# Patient Record
Sex: FEMALE | Race: WHITE | NOT HISPANIC OR LATINO | Employment: OTHER | URBAN - METROPOLITAN AREA
[De-identification: names, ages, dates, MRNs, and addresses within clinical notes are randomized per-mention and may not be internally consistent; named-entity substitution may affect disease eponyms.]

---

## 2017-12-29 ENCOUNTER — TRANSCRIBE ORDERS (OUTPATIENT)
Dept: ADMINISTRATIVE | Facility: HOSPITAL | Age: 67
End: 2017-12-29

## 2017-12-29 DIAGNOSIS — R92.8 ABNORMAL MAMMOGRAM: Primary | ICD-10-CM

## 2018-02-07 ENCOUNTER — APPOINTMENT (OUTPATIENT)
Dept: MAMMOGRAPHY | Facility: HOSPITAL | Age: 68
End: 2018-02-07
Attending: FAMILY MEDICINE

## 2018-02-08 ENCOUNTER — APPOINTMENT (OUTPATIENT)
Dept: MAMMOGRAPHY | Facility: HOSPITAL | Age: 68
End: 2018-02-08
Attending: FAMILY MEDICINE

## 2018-04-19 ENCOUNTER — HOSPITAL ENCOUNTER (OUTPATIENT)
Dept: ULTRASOUND IMAGING | Facility: HOSPITAL | Age: 68
Discharge: HOME | End: 2018-04-19

## 2018-04-19 ENCOUNTER — HOSPITAL ENCOUNTER (OUTPATIENT)
Dept: MAMMOGRAPHY | Facility: HOSPITAL | Age: 68
Discharge: HOME | End: 2018-04-19
Attending: FAMILY MEDICINE | Admitting: FAMILY MEDICINE

## 2018-04-19 DIAGNOSIS — R92.8 ABNORMAL MAMMOGRAM: ICD-10-CM

## 2018-04-19 PROCEDURE — 77066 DX MAMMO INCL CAD BI: CPT | Performed by: RADIOLOGY

## 2018-04-19 PROCEDURE — 77066 DX MAMMO INCL CAD BI: CPT

## 2018-04-19 PROCEDURE — G0279 TOMOSYNTHESIS, MAMMO: HCPCS

## 2018-04-19 PROCEDURE — 76642 ULTRASOUND BREAST LIMITED: CPT | Performed by: RADIOLOGY

## 2018-04-19 PROCEDURE — G0279 TOMOSYNTHESIS, MAMMO: HCPCS | Performed by: RADIOLOGY

## 2018-04-19 PROCEDURE — 76642 ULTRASOUND BREAST LIMITED: CPT

## 2019-06-19 ENCOUNTER — TRANSCRIBE ORDERS (OUTPATIENT)
Dept: ADMINISTRATIVE | Facility: HOSPITAL | Age: 69
End: 2019-06-19

## 2019-06-19 DIAGNOSIS — R92.8 ABNORMAL MAMMOGRAM: Primary | ICD-10-CM

## 2019-09-03 ENCOUNTER — HOSPITAL ENCOUNTER (OUTPATIENT)
Dept: ULTRASOUND IMAGING | Facility: HOSPITAL | Age: 69
Discharge: HOME | End: 2019-09-03

## 2019-09-03 ENCOUNTER — HOSPITAL ENCOUNTER (OUTPATIENT)
Dept: MAMMOGRAPHY | Facility: HOSPITAL | Age: 69
Discharge: HOME | End: 2019-09-03
Admitting: FAMILY MEDICINE

## 2019-09-03 DIAGNOSIS — R92.8 ABNORMAL MAMMOGRAM: ICD-10-CM

## 2019-09-03 PROCEDURE — 77066 DX MAMMO INCL CAD BI: CPT | Performed by: RADIOLOGY

## 2019-09-03 PROCEDURE — 76642 ULTRASOUND BREAST LIMITED: CPT | Performed by: RADIOLOGY

## 2019-09-03 PROCEDURE — G0279 TOMOSYNTHESIS, MAMMO: HCPCS | Performed by: RADIOLOGY

## 2019-09-03 PROCEDURE — 76642 ULTRASOUND BREAST LIMITED: CPT

## 2019-09-03 PROCEDURE — 77066 DX MAMMO INCL CAD BI: CPT

## 2019-09-03 PROCEDURE — G0279 TOMOSYNTHESIS, MAMMO: HCPCS

## 2020-08-17 ENCOUNTER — TRANSCRIBE ORDERS (OUTPATIENT)
Dept: ADMINISTRATIVE | Facility: HOSPITAL | Age: 70
End: 2020-08-17

## 2020-08-17 ENCOUNTER — HOSPITAL ENCOUNTER (OUTPATIENT)
Dept: GENERAL RADIOLOGY | Facility: HOSPITAL | Age: 70
Discharge: HOME | End: 2020-08-17
Admitting: FAMILY MEDICINE

## 2020-08-17 DIAGNOSIS — R07.9 CHEST PAIN, UNSPECIFIED TYPE: ICD-10-CM

## 2020-08-17 DIAGNOSIS — R07.9 CHEST PAIN, UNSPECIFIED TYPE: Primary | ICD-10-CM

## 2020-08-17 PROCEDURE — 71046 X-RAY EXAM CHEST 2 VIEWS: CPT

## 2020-10-05 ENCOUNTER — TRANSCRIBE ORDERS (OUTPATIENT)
Dept: ADMINISTRATIVE | Facility: HOSPITAL | Age: 70
End: 2020-10-05

## 2020-10-05 DIAGNOSIS — Z12.31 VISIT FOR SCREENING MAMMOGRAM: Primary | ICD-10-CM

## 2020-10-06 ENCOUNTER — HOSPITAL ENCOUNTER (OUTPATIENT)
Dept: MAMMOGRAPHY | Facility: HOSPITAL | Age: 70
Discharge: HOME | End: 2020-10-06
Admitting: FAMILY MEDICINE

## 2020-10-06 DIAGNOSIS — Z12.31 VISIT FOR SCREENING MAMMOGRAM: ICD-10-CM

## 2020-10-06 PROCEDURE — 77063 BREAST TOMOSYNTHESIS BI: CPT | Performed by: RADIOLOGY

## 2020-10-06 PROCEDURE — 77063 BREAST TOMOSYNTHESIS BI: CPT

## 2020-10-06 PROCEDURE — 77067 SCR MAMMO BI INCL CAD: CPT

## 2020-10-06 PROCEDURE — 77067 SCR MAMMO BI INCL CAD: CPT | Performed by: RADIOLOGY

## 2020-10-28 ENCOUNTER — TRANSCRIBE ORDERS (OUTPATIENT)
Dept: ADMINISTRATIVE | Facility: HOSPITAL | Age: 70
End: 2020-10-28

## 2020-10-28 ENCOUNTER — HOSPITAL ENCOUNTER (OUTPATIENT)
Dept: GENERAL RADIOLOGY | Facility: HOSPITAL | Age: 70
Discharge: HOME | End: 2020-10-28
Admitting: FAMILY MEDICINE

## 2020-10-28 DIAGNOSIS — R06.02 SHORTNESS OF BREATH: Primary | ICD-10-CM

## 2020-10-28 DIAGNOSIS — R06.02 SHORTNESS OF BREATH: ICD-10-CM

## 2020-10-28 PROCEDURE — 71046 X-RAY EXAM CHEST 2 VIEWS: CPT

## 2020-11-03 ENCOUNTER — TRANSCRIBE ORDERS (OUTPATIENT)
Dept: ADMINISTRATIVE | Facility: HOSPITAL | Age: 70
End: 2020-11-03

## 2020-11-03 DIAGNOSIS — R01.1 SYSTOLIC MURMUR: Primary | ICD-10-CM

## 2020-12-10 ENCOUNTER — HOSPITAL ENCOUNTER (OUTPATIENT)
Dept: CARDIOLOGY | Facility: HOSPITAL | Age: 70
Discharge: HOME | End: 2020-12-10
Admitting: FAMILY MEDICINE

## 2020-12-10 VITALS — HEIGHT: 68 IN | WEIGHT: 165 LBS | BODY MASS INDEX: 25.01 KG/M2

## 2020-12-10 DIAGNOSIS — R01.1 SYSTOLIC MURMUR: ICD-10-CM

## 2020-12-10 LAB
BH CV ECHO MEAS - AO MAX PG (FULL): 1.9 MMHG
BH CV ECHO MEAS - AO MAX PG: 4 MMHG
BH CV ECHO MEAS - AO MEAN PG (FULL): 2 MMHG
BH CV ECHO MEAS - AO MEAN PG: 3 MMHG
BH CV ECHO MEAS - AO ROOT AREA (BSA CORRECTED): 1.6
BH CV ECHO MEAS - AO ROOT AREA: 7.5 CM^2
BH CV ECHO MEAS - AO ROOT DIAM: 3.1 CM
BH CV ECHO MEAS - AO V2 MAX: 103 CM/SEC
BH CV ECHO MEAS - AO V2 MEAN: 76.9 CM/SEC
BH CV ECHO MEAS - AO V2 VTI: 26.2 CM
BH CV ECHO MEAS - ASC AORTA: 3.4 CM
BH CV ECHO MEAS - AVA(I,A): 2.4 CM^2
BH CV ECHO MEAS - AVA(I,D): 2.4 CM^2
BH CV ECHO MEAS - AVA(V,A): 2.2 CM^2
BH CV ECHO MEAS - AVA(V,D): 2.2 CM^2
BH CV ECHO MEAS - BSA(HAYCOCK): 1.9 M^2
BH CV ECHO MEAS - BSA: 1.9 M^2
BH CV ECHO MEAS - BZI_BMI: 25.1 KILOGRAMS/M^2
BH CV ECHO MEAS - BZI_METRIC_HEIGHT: 172.7 CM
BH CV ECHO MEAS - BZI_METRIC_WEIGHT: 74.8 KG
BH CV ECHO MEAS - EDV(CUBED): 26.2 ML
BH CV ECHO MEAS - EDV(MOD-SP2): 91 ML
BH CV ECHO MEAS - EDV(MOD-SP4): 101 ML
BH CV ECHO MEAS - EDV(TEICH): 34.2 ML
BH CV ECHO MEAS - EF(CUBED): 65.2 %
BH CV ECHO MEAS - EF(MOD-BP): 59 %
BH CV ECHO MEAS - EF(MOD-SP2): 58.2 %
BH CV ECHO MEAS - EF(MOD-SP4): 60.4 %
BH CV ECHO MEAS - EF(TEICH): 58.3 %
BH CV ECHO MEAS - ESV(CUBED): 9.1 ML
BH CV ECHO MEAS - ESV(MOD-SP2): 38 ML
BH CV ECHO MEAS - ESV(MOD-SP4): 40 ML
BH CV ECHO MEAS - ESV(TEICH): 14.2 ML
BH CV ECHO MEAS - FS: 29.6 %
BH CV ECHO MEAS - IVS/LVPW: 1.1
BH CV ECHO MEAS - IVSD: 1 CM
BH CV ECHO MEAS - LA DIMENSION: 3 CM
BH CV ECHO MEAS - LA/AO: 0.97
BH CV ECHO MEAS - LAD MAJOR: 5.1 CM
BH CV ECHO MEAS - LAT PEAK E' VEL: 9.5 CM/SEC
BH CV ECHO MEAS - LATERAL E/E' RATIO: 7.2
BH CV ECHO MEAS - LV DIASTOLIC VOL/BSA (35-75): 53.6 ML/M^2
BH CV ECHO MEAS - LV IVRT: 0.1 SEC
BH CV ECHO MEAS - LV MASS(C)D: 76.9 GRAMS
BH CV ECHO MEAS - LV MASS(C)DI: 40.8 GRAMS/M^2
BH CV ECHO MEAS - LV MAX PG: 2.1 MMHG
BH CV ECHO MEAS - LV MEAN PG: 1 MMHG
BH CV ECHO MEAS - LV SYSTOLIC VOL/BSA (12-30): 21.2 ML/M^2
BH CV ECHO MEAS - LV V1 MAX: 72.9 CM/SEC
BH CV ECHO MEAS - LV V1 MEAN: 48.2 CM/SEC
BH CV ECHO MEAS - LV V1 VTI: 20 CM
BH CV ECHO MEAS - LVIDD: 3 CM
BH CV ECHO MEAS - LVIDS: 2.1 CM
BH CV ECHO MEAS - LVLD AP2: 8.1 CM
BH CV ECHO MEAS - LVLD AP4: 7.8 CM
BH CV ECHO MEAS - LVLS AP2: 6.7 CM
BH CV ECHO MEAS - LVLS AP4: 6.5 CM
BH CV ECHO MEAS - LVOT AREA (M): 3.1 CM^2
BH CV ECHO MEAS - LVOT AREA: 3.1 CM^2
BH CV ECHO MEAS - LVOT DIAM: 2 CM
BH CV ECHO MEAS - LVPWD: 0.92 CM
BH CV ECHO MEAS - MED PEAK E' VEL: 5.4 CM/SEC
BH CV ECHO MEAS - MEDIAL E/E' RATIO: 12.9
BH CV ECHO MEAS - MV A MAX VEL: 65.2 CM/SEC
BH CV ECHO MEAS - MV DEC SLOPE: 227 CM/SEC^2
BH CV ECHO MEAS - MV DEC TIME: 0.25 SEC
BH CV ECHO MEAS - MV E MAX VEL: 69.1 CM/SEC
BH CV ECHO MEAS - MV E/A: 1.1
BH CV ECHO MEAS - MV P1/2T MAX VEL: 69 CM/SEC
BH CV ECHO MEAS - MV P1/2T: 89 MSEC
BH CV ECHO MEAS - MVA P1/2T LCG: 3.2 CM^2
BH CV ECHO MEAS - MVA(P1/2T): 2.5 CM^2
BH CV ECHO MEAS - PA ACC SLOPE: 662 CM/SEC^2
BH CV ECHO MEAS - PA ACC TIME: 0.11 SEC
BH CV ECHO MEAS - PA PR(ACCEL): 29.1 MMHG
BH CV ECHO MEAS - PA V2 MAX: 86 CM/SEC
BH CV ECHO MEAS - PULM A REVS VEL: 33.6 CM/SEC
BH CV ECHO MEAS - PULM DIAS VEL: 37.5 CM/SEC
BH CV ECHO MEAS - PULM S/D: 1.7
BH CV ECHO MEAS - PULM SYS VEL: 62.2 CM/SEC
BH CV ECHO MEAS - RAP SYSTOLE: 8 MMHG
BH CV ECHO MEAS - RVSP: 33 MMHG
BH CV ECHO MEAS - SI(AO): 105 ML/M^2
BH CV ECHO MEAS - SI(CUBED): 9.1 ML/M^2
BH CV ECHO MEAS - SI(LVOT): 33.4 ML/M^2
BH CV ECHO MEAS - SI(MOD-SP2): 28.1 ML/M^2
BH CV ECHO MEAS - SI(MOD-SP4): 32.4 ML/M^2
BH CV ECHO MEAS - SI(TEICH): 10.6 ML/M^2
BH CV ECHO MEAS - SV(AO): 197.7 ML
BH CV ECHO MEAS - SV(CUBED): 17.1 ML
BH CV ECHO MEAS - SV(LVOT): 62.8 ML
BH CV ECHO MEAS - SV(MOD-SP2): 53 ML
BH CV ECHO MEAS - SV(MOD-SP4): 61 ML
BH CV ECHO MEAS - SV(TEICH): 19.9 ML
BH CV ECHO MEAS - TAPSE (>1.6): 2.3 CM
BH CV ECHO MEAS - TR MAX PG: 25 MMHG
BH CV ECHO MEAS - TR MAX VEL: 248 CM/SEC
BH CV ECHO MEASUREMENTS AVERAGE E/E' RATIO: 9.28
BH CV VAS BP LEFT ARM: NORMAL MMHG
BH CV XLRA - RV BASE: 3.9 CM
BH CV XLRA - RV LENGTH: 6.8 CM
BH CV XLRA - RV MID: 3.5 CM
BH CV XLRA - TDI S': 12.2 CM/SEC
LEFT ATRIUM VOLUME INDEX: 29.7 ML/M^2
LEFT ATRIUM VOLUME: 56 ML
LV EF 2D ECHO EST: 70 %

## 2020-12-10 PROCEDURE — 93306 TTE W/DOPPLER COMPLETE: CPT

## 2020-12-10 PROCEDURE — 93306 TTE W/DOPPLER COMPLETE: CPT | Performed by: INTERNAL MEDICINE

## 2021-06-23 ENCOUNTER — TRANSCRIBE ORDERS (OUTPATIENT)
Dept: ADMINISTRATIVE | Facility: HOSPITAL | Age: 71
End: 2021-06-23

## 2021-06-23 DIAGNOSIS — R05.3 PERSISTENT COUGH: Primary | ICD-10-CM

## 2021-07-19 ENCOUNTER — HOSPITAL ENCOUNTER (OUTPATIENT)
Dept: CT IMAGING | Facility: HOSPITAL | Age: 71
Discharge: HOME | End: 2021-07-19
Admitting: FAMILY MEDICINE

## 2021-07-19 DIAGNOSIS — R05.3 PERSISTENT COUGH: ICD-10-CM

## 2021-07-19 PROCEDURE — 71250 CT THORAX DX C-: CPT

## 2022-03-15 ENCOUNTER — TRANSCRIBE ORDERS (OUTPATIENT)
Dept: ADMINISTRATIVE | Facility: HOSPITAL | Age: 72
End: 2022-03-15

## 2022-03-15 ENCOUNTER — HOSPITAL ENCOUNTER (OUTPATIENT)
Dept: GENERAL RADIOLOGY | Facility: HOSPITAL | Age: 72
Discharge: HOME | End: 2022-03-15
Admitting: FAMILY MEDICINE

## 2022-03-15 DIAGNOSIS — M75.42 SHOULDER IMPINGEMENT, LEFT: Primary | ICD-10-CM

## 2022-03-15 DIAGNOSIS — M75.42 SHOULDER IMPINGEMENT, LEFT: ICD-10-CM

## 2022-03-15 PROCEDURE — 73030 X-RAY EXAM OF SHOULDER: CPT

## 2022-08-16 ENCOUNTER — TRANSCRIBE ORDERS (OUTPATIENT)
Dept: ADMINISTRATIVE | Facility: HOSPITAL | Age: 72
End: 2022-08-16

## 2022-08-16 ENCOUNTER — HOSPITAL ENCOUNTER (OUTPATIENT)
Dept: GENERAL RADIOLOGY | Facility: HOSPITAL | Age: 72
Discharge: HOME | End: 2022-08-16
Admitting: FAMILY MEDICINE

## 2022-08-16 DIAGNOSIS — J84.10 PULMONARY GRANULOMA: Primary | ICD-10-CM

## 2022-08-16 DIAGNOSIS — J84.10 PULMONARY GRANULOMA: ICD-10-CM

## 2022-08-16 PROCEDURE — 71046 X-RAY EXAM CHEST 2 VIEWS: CPT

## 2022-09-26 ENCOUNTER — OFFICE VISIT (OUTPATIENT)
Dept: PULMONOLOGY | Facility: CLINIC | Age: 72
End: 2022-09-26

## 2022-09-26 VITALS
RESPIRATION RATE: 16 BRPM | HEIGHT: 68 IN | WEIGHT: 173.13 LBS | HEART RATE: 53 BPM | BODY MASS INDEX: 26.24 KG/M2 | SYSTOLIC BLOOD PRESSURE: 144 MMHG | OXYGEN SATURATION: 97 % | TEMPERATURE: 97.7 F | DIASTOLIC BLOOD PRESSURE: 96 MMHG

## 2022-09-26 DIAGNOSIS — R06.02 SOB (SHORTNESS OF BREATH): Primary | ICD-10-CM

## 2022-09-26 DIAGNOSIS — U09.9 POST-COVID CHRONIC COUGH: ICD-10-CM

## 2022-09-26 DIAGNOSIS — R05.3 POST-COVID CHRONIC COUGH: ICD-10-CM

## 2022-09-26 PROCEDURE — 94375 RESPIRATORY FLOW VOLUME LOOP: CPT | Performed by: INTERNAL MEDICINE

## 2022-09-26 PROCEDURE — 94729 DIFFUSING CAPACITY: CPT | Performed by: INTERNAL MEDICINE

## 2022-09-26 PROCEDURE — 94726 PLETHYSMOGRAPHY LUNG VOLUMES: CPT | Performed by: INTERNAL MEDICINE

## 2022-09-26 PROCEDURE — 99203 OFFICE O/P NEW LOW 30 MIN: CPT | Performed by: INTERNAL MEDICINE

## 2022-09-26 RX ORDER — BUDESONIDE, GLYCOPYRROLATE, AND FORMOTEROL FUMARATE 160; 9; 4.8 UG/1; UG/1; UG/1
2 AEROSOL, METERED RESPIRATORY (INHALATION) 2 TIMES DAILY
COMMUNITY
Start: 2022-08-26 | End: 2022-09-26

## 2022-09-26 RX ORDER — ALBUTEROL SULFATE 90 UG/1
2 AEROSOL, METERED RESPIRATORY (INHALATION) EVERY 4 HOURS PRN
Qty: 18 G | Refills: 11 | Status: SHIPPED | OUTPATIENT
Start: 2022-09-26

## 2022-09-26 RX ORDER — LEVOTHYROXINE SODIUM 0.15 MG/1
150 TABLET ORAL DAILY
COMMUNITY
Start: 2022-03-15

## 2022-09-26 RX ORDER — SERTRALINE HYDROCHLORIDE 25 MG/1
25 TABLET, FILM COATED ORAL DAILY
COMMUNITY
Start: 2022-07-05

## 2022-09-26 RX ORDER — TRIAMTERENE AND HYDROCHLOROTHIAZIDE 37.5; 25 MG/1; MG/1
1 CAPSULE ORAL EVERY MORNING
COMMUNITY

## 2022-09-26 NOTE — PROGRESS NOTES
Reba Kong is a 71 y.o. female here for evaluation of   Chief Complaint   Patient presents with   • Persistent Cough       Problem list:  1. 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 Dyspnea on exertion  3. GERD/hiatal hernia  4. Hyperlipidemia  5. Hypothyroidism  6.  1 para 1  7. EGD , peptic ulcer disease, GERD, hiatal hernia  8. Hysterectomy   9. Colonoscopy with polypectomy, unknown  10. Oophorectomy  11. Penicillin causes a rash  12. No tobacco    History of Present Illness    71-year-old woman, non-smoker, antibiotics or who has a history of 2 COVID-19 infections, the first in 2020 and again in 2021.  She did get monoclonal antibodies for one of the infections.  She was clinically ill for about 3 weeks with her first infection.  However her second 1 was a mild illness.  Since her COVID infection she has been short of air  and has had a persistent cough.  She feels symptomatically short of air anytime she bends over.  However she reports that she can walk fine do her housework without difficulty.  The cough also occurs when she bends over or if she is anxious.  She also coughs at night when she lays down.  She does not hear wheezing.  She has no history of asthma.  She was around secondhand smoke as a child.  She denies noxious fume exposure or mold.  She was given breztri as a trial.  She is not using it regularly.  When she does use it though she gets some symptomatic relief.  She is only used it 3 or 4 times.  She was not aware that she had GERD on a previous EGD.  She denies symptoms of heartburn or waterbrash symptoms.      Review of Systems   Respiratory: Positive for cough and shortness of breath.    All other systems reviewed and are negative.        Current Outpatient Medications:   •  Budeson-Glycopyrrol-Formoterol (Breztri Aerosphere) 160-9-4.8 MCG/ACT aerosol inhaler, Inhale 2 puffs 2 (Two) Times a Day., Disp: , Rfl:   •  levothyroxine (SYNTHROID, LEVOTHROID) 150 MCG  "tablet, Take 150 mcg by mouth Daily., Disp: , Rfl:   •  sertraline (ZOLOFT) 25 MG tablet, Take 25 mg by mouth Daily., Disp: , Rfl:   •  triamterene-hydrochlorothiazide (DYAZIDE) 37.5-25 MG per capsule, Take 1 capsule by mouth Every Morning., Disp: , Rfl:     Past Medical History:   Diagnosis Date   • Hiatal hernia with GERD    • Hyperlipidemia    • Hypothyroidism    • PUD (peptic ulcer disease) 2015     Past Surgical History:   Procedure Laterality Date   • COLONOSCOPY W/ POLYPECTOMY     • ENDOSCOPY  2015   • HYSTERECTOMY  2003   • OOPHORECTOMY       Social History     Socioeconomic History   • Marital status:    • Number of children: 1   Tobacco Use   • Smoking status: Never Smoker   • Smokeless tobacco: Never Used   Substance and Sexual Activity   • Alcohol use: Yes     Alcohol/week: 4.0 - 7.0 standard drinks     Types: 4 - 7 Glasses of wine per week   • Drug use: Never   • Sexual activity: Defer     Family History   Problem Relation Age of Onset   • Lung cancer Mother    • Leukemia Father    • Heart failure Brother    • Breast cancer Neg Hx    • Ovarian cancer Neg Hx    • Endometrial cancer Neg Hx      Blood pressure 144/96, pulse 53, temperature 97.7 °F (36.5 °C), resp. rate 16, height 172.7 cm (68\"), weight 78.5 kg (173 lb 2 oz), SpO2 97 %.    Physical Exam  Constitutional:       General: She is not in acute distress.  HENT:      Head: Normocephalic and atraumatic.      Nose: No congestion.      Mouth/Throat:      Mouth: Mucous membranes are moist.      Pharynx: Oropharynx is clear.   Eyes:      Conjunctiva/sclera: Conjunctivae normal.      Pupils: Pupils are equal, round, and reactive to light.   Cardiovascular:      Rate and Rhythm: Normal rate and regular rhythm.      Heart sounds: No murmur heard.  Pulmonary:      Breath sounds: No wheezing.      Comments: Each time took deep breath she coughs on expiration  Abdominal:      General: Bowel sounds are normal.      Palpations: Abdomen is soft. "   Musculoskeletal:      Right lower leg: No edema.      Left lower leg: No edema.   Lymphadenopathy:      Cervical: No cervical adenopathy.   Skin:     General: Skin is warm and dry.   Neurological:      Mental Status: She is alert and oriented to person, place, and time.   Psychiatric:         Mood and Affect: Mood normal.           PFTs:    September 26, 2022, PFTs, FVC 1.88 L, 53%, FEV1 1.39 L, 51% ratio 74%, mid flows 45%, total lung capacity 3.69 L, 66%, diffusion capacity 13.3, 60%, patient coughed throughout the test which may have impacted her numbers, restriction and obstruction, diffusion impairment    Radiology:    PA and lateral chest x-ray August 10, 2022, scattered calcified granulomatous findings, no infiltrate or effusion, heart not enlarged    COMPARISON: NONE     FINDINGS: No pathologic axillary adenopathy or other worrisome body wall  soft tissue finding in the chest. No acute findings in the partially  imaged upper abdomen. No pleural or pericardial effusion. No pathologic  mediastinal or hilar adenopathy. Minimally atherosclerotic nonaneurysmal  thoracic aorta. Evaluation of the osseous structures demonstrates no  evidence of acute fracture or aggressive osseous lesion. Evaluation of  the lung parenchyma demonstrates no evidence of acute infectious or  inflammatory process. There are no suspicious pulmonary nodules.     IMPRESSION:  Essentially normal noncontrast CT of the chest. There are no  suspicious pulmonary nodules and there is no evidence of acute  infectious or inflammatory process.        This report was finalized on 7/20/2021 8:59 AM by Bruno Walter.       Lab:    Diagnoses and all orders for this visit:    1. SOB (shortness of breath) (Primary)  -     Pulmonary Function Test    2. Post-COVID chronic cough        Discussion:     Interesting 71-year-old woman with shortness of air and cough since a COVID-19 infection.  Her PFTs appear restricted.  But her symptoms appear more  airway inflammatory response.  She did cough throughout her PFT and I wonder if that is why her vital capacity is low.  She does not have interstitial lung disease on the CT scan done a year ago.  I am quite relieved that I do not see any fibrotic changes as that would likely be permanent and unrelenting.  She is been given several inhalers since developing this persistent cough but she is never really use them regularly.  On exam anytime she takes a deep breath and starts to exhale she will cough through the exhalation and I cannot hear the wheezing before because of the coughing.  This to me is a sign of airway inflammation.  Given the length of time that has passed, she will likely need inhalers long-term.  She does not have any questions about the COVID-vaccine and is not planning on taking it    -Sample of Breo 100- 25 mcg, 1 puff daily, 28-day supply  -I looked and it appears that generic Advair but only the HFA is covered.  If she improves with the Breo I would start budesonide/albuterol 115 mcg, 2 puffs twice daily with a spacer  -She will need a lot of education about her inhalers because she has been noncompliant  -Prescription for albuterol HFA to use 2 puffs as needed for cough or shortness of air  -Inhaler instruction on proper use  -Follow-up in 3 weeks    Grisel Baker MD

## 2022-10-28 ENCOUNTER — OFFICE VISIT (OUTPATIENT)
Dept: PULMONOLOGY | Facility: CLINIC | Age: 72
End: 2022-10-28

## 2022-10-28 VITALS
HEART RATE: 75 BPM | HEIGHT: 68 IN | RESPIRATION RATE: 14 BRPM | TEMPERATURE: 96.8 F | OXYGEN SATURATION: 92 % | SYSTOLIC BLOOD PRESSURE: 102 MMHG | WEIGHT: 175 LBS | BODY MASS INDEX: 26.52 KG/M2 | DIASTOLIC BLOOD PRESSURE: 80 MMHG

## 2022-10-28 DIAGNOSIS — R05.3 POST-COVID CHRONIC COUGH: Primary | ICD-10-CM

## 2022-10-28 DIAGNOSIS — U09.9 POST-COVID CHRONIC COUGH: Primary | ICD-10-CM

## 2022-10-28 PROCEDURE — 94729 DIFFUSING CAPACITY: CPT | Performed by: INTERNAL MEDICINE

## 2022-10-28 PROCEDURE — 99213 OFFICE O/P EST LOW 20 MIN: CPT | Performed by: INTERNAL MEDICINE

## 2022-10-28 PROCEDURE — 94726 PLETHYSMOGRAPHY LUNG VOLUMES: CPT | Performed by: INTERNAL MEDICINE

## 2022-10-28 PROCEDURE — 94375 RESPIRATORY FLOW VOLUME LOOP: CPT | Performed by: INTERNAL MEDICINE

## 2022-10-28 NOTE — PROGRESS NOTES
Reba Kong is a 71 y.o. female here for evaluation of   Chief Complaint   Patient presents with   • Shortness of Breath     4 WK F/U       Problem list:  1. 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 Dyspnea on exertion  3. GERD/hiatal hernia  4. Hyperlipidemia  5. Hypothyroidism  6.  1 para 1  7. EGD , peptic ulcer disease, GERD, hiatal hernia  8. Hysterectomy   9. Colonoscopy with polypectomy, unknown  10. Oophorectomy  11. Penicillin causes a rash  12. No tobacco    History of Present Illness    71-year-old woman, non-smoker, antibiotics or who has a history of 2 COVID-19 infections, the first in 2020 and again in 2021.  She did get monoclonal antibodies for one of the infections.  She was clinically ill for about 3 weeks with her first infection.  However her second 1 was a mild illness.  Since her COVID infection she has been short of air  and has had a persistent cough.    Breo did help decrease coughing episodes.  She tends to cough more at night when she tries to lay down.  She does not have postnasal drip or GERD symptoms      Review of Systems   HENT: Negative for congestion and postnasal drip.    Respiratory: Positive for cough. Negative for shortness of breath.          Current Outpatient Medications:   •  albuterol sulfate  (90 Base) MCG/ACT inhaler, Inhale 2 puffs Every 4 (Four) Hours As Needed for Wheezing., Disp: 18 g, Rfl: 11  •  levothyroxine (SYNTHROID, LEVOTHROID) 150 MCG tablet, Take 150 mcg by mouth Daily., Disp: , Rfl:   •  sertraline (ZOLOFT) 25 MG tablet, Take 25 mg by mouth Daily., Disp: , Rfl:   •  triamterene-hydrochlorothiazide (DYAZIDE) 37.5-25 MG per capsule, Take 1 capsule by mouth Every Morning., Disp: , Rfl:     Past Medical History:   Diagnosis Date   • Hiatal hernia with GERD    • Hyperlipidemia    • Hypothyroidism    • PUD (peptic ulcer disease)      Past Surgical History:   Procedure Laterality Date   • COLONOSCOPY W/ POLYPECTOMY     •  "ENDOSCOPY  2015   • HYSTERECTOMY  2003   • OOPHORECTOMY       Social History     Socioeconomic History   • Marital status:    • Number of children: 1   Tobacco Use   • Smoking status: Never   • Smokeless tobacco: Never   Vaping Use   • Vaping Use: Never used   Substance and Sexual Activity   • Alcohol use: Yes     Alcohol/week: 4.0 - 7.0 standard drinks     Types: 4 - 7 Glasses of wine per week   • Drug use: Never   • Sexual activity: Defer     Family History   Problem Relation Age of Onset   • Lung cancer Mother    • Leukemia Father    • Heart failure Brother    • Breast cancer Neg Hx    • Ovarian cancer Neg Hx    • Endometrial cancer Neg Hx      Blood pressure 102/80, pulse 75, temperature 96.8 °F (36 °C), temperature source Infrared, resp. rate 14, height 172.7 cm (68\"), weight 79.4 kg (175 lb), SpO2 92 %.    Physical Exam  HENT:      Nose: No congestion.   Cardiovascular:      Rate and Rhythm: Normal rate and regular rhythm.      Heart sounds: No murmur heard.  Pulmonary:      Effort: Pulmonary effort is normal.      Breath sounds: No wheezing or rhonchi.           PFTs:    October 28, 2022 FVC 2.22 L, 62%, FEV1 1.47 L, 54% ratio 66%, total lung capacity 3.91 L, 69%, diffusion capacity 14.7, 66%, mixed obstruction and restriction, mild diffusion impairment, slightly improved compared to September September 26, 2022, PFTs, FVC 1.88 L, 53%, FEV1 1.39 L, 51% ratio 74%, mid flows 45%, total lung capacity 3.69 L, 66%, diffusion capacity 13.3, 60%, patient coughed throughout the test which may have impacted her numbers, restriction and obstruction, diffusion impairment    Radiology:    PA and lateral chest x-ray August 10, 2022, scattered calcified granulomatous findings, no infiltrate or effusion, heart not enlarged    COMPARISON: NONE     FINDINGS: No pathologic axillary adenopathy or other worrisome body wall  soft tissue finding in the chest. No acute findings in the partially  imaged upper abdomen. No " pleural or pericardial effusion. No pathologic  mediastinal or hilar adenopathy. Minimally atherosclerotic nonaneurysmal  thoracic aorta. Evaluation of the osseous structures demonstrates no  evidence of acute fracture or aggressive osseous lesion. Evaluation of  the lung parenchyma demonstrates no evidence of acute infectious or  inflammatory process. There are no suspicious pulmonary nodules.     IMPRESSION:  Essentially normal noncontrast CT of the chest. There are no  suspicious pulmonary nodules and there is no evidence of acute  infectious or inflammatory process.        This report was finalized on 7/20/2021 8:59 AM by Bruno Walter.       Lab:    Diagnoses and all orders for this visit:    1. Post-COVID chronic cough (Primary)        Discussion:     71-year-old woman who developed COVID-19 in September 2020 and again in January 2021.  She presented with persistent coughing post viral illness.  She gets paroxysms of coughing.  She was given a Trelegy inhaler and felt that it helped somewhat.  When I saw her I gave her samples of Breo and the frequency of the cough is definitely improved but she does continue to cough some at night.  Breo is not covered by her insurance.  I found samples of Asmanex 110 mcg and instructed her on how to use it.  She will use 1 puff a day.  She has an albuterol inhaler and can use that when the coughing spells occur.  Pulmonary function tests are interesting with some mild obstruction and restriction.  The obstruction seems to be more small airways disease.  She has no evidence of interstitial disease or fibrosis on her CT scan.  She does not appear clinically to have muscle weakness and does not have significant scoliosis.    -Asmanex 110 1 puff daily  -Albuterol rescue inhaler as needed  -Follow-up in January  -By Valent COVID-vaccine, high-dose influenza vaccine per her primary  -Check with her primary care about her Prevnar vaccine status    Grisel Baker,  MD

## 2022-12-27 ENCOUNTER — HOSPITAL ENCOUNTER (OUTPATIENT)
Dept: GENERAL RADIOLOGY | Facility: HOSPITAL | Age: 72
Discharge: HOME | End: 2022-12-27
Admitting: FAMILY MEDICINE

## 2022-12-27 ENCOUNTER — TRANSCRIBE ORDERS (OUTPATIENT)
Dept: ADMINISTRATIVE | Facility: HOSPITAL | Age: 72
End: 2022-12-27

## 2022-12-27 DIAGNOSIS — M89.8X1 CLAVICLE PAIN: Primary | ICD-10-CM

## 2022-12-27 DIAGNOSIS — M89.8X1 CLAVICLE PAIN: ICD-10-CM

## 2022-12-27 PROCEDURE — 73000 X-RAY EXAM OF COLLAR BONE: CPT

## 2023-08-21 ENCOUNTER — OFFICE VISIT (OUTPATIENT)
Dept: CARDIOLOGY | Facility: CLINIC | Age: 73
End: 2023-08-21
Payer: MEDICARE

## 2023-08-21 ENCOUNTER — PATIENT ROUNDING (BHMG ONLY) (OUTPATIENT)
Dept: CARDIOLOGY | Facility: CLINIC | Age: 73
End: 2023-08-21

## 2023-08-21 VITALS
BODY MASS INDEX: 25.82 KG/M2 | HEIGHT: 68 IN | WEIGHT: 170.4 LBS | SYSTOLIC BLOOD PRESSURE: 112 MMHG | DIASTOLIC BLOOD PRESSURE: 60 MMHG | OXYGEN SATURATION: 96 % | HEART RATE: 50 BPM

## 2023-08-21 DIAGNOSIS — R00.1 BRADYCARDIA, SINUS: Primary | ICD-10-CM

## 2023-08-21 PROCEDURE — 1160F RVW MEDS BY RX/DR IN RCRD: CPT | Performed by: INTERNAL MEDICINE

## 2023-08-21 PROCEDURE — 99203 OFFICE O/P NEW LOW 30 MIN: CPT | Performed by: INTERNAL MEDICINE

## 2023-08-21 PROCEDURE — 1159F MED LIST DOCD IN RCRD: CPT | Performed by: INTERNAL MEDICINE

## 2023-08-21 PROCEDURE — 93000 ELECTROCARDIOGRAM COMPLETE: CPT | Performed by: INTERNAL MEDICINE

## 2023-08-21 RX ORDER — SENNOSIDES 8.6 MG
CAPSULE ORAL AS NEEDED
COMMUNITY

## 2023-08-21 RX ORDER — HYDROXYZINE HYDROCHLORIDE 10 MG/1
TABLET, FILM COATED ORAL AS NEEDED
COMMUNITY
Start: 2023-05-04

## 2023-08-21 NOTE — PROGRESS NOTES
August 21, 2023    Hello, may I speak with Reba Kong?    My name is Paris      I am  with [facility]  [address]  [facility] [address] [phone].    Before we get started may I verify your date of birth? 1950    I am calling to officially welcome you to our practice and ask about your recent visit. Is this a good time to talk? yes    Tell me about your visit with us. What things went well?  everything       We're always looking for ways to make our patients' experiences even better. Do you have recommendations on ways we may improve?  no    Overall were you satisfied with your first visit to our practice? yes       I appreciate you taking the time to speak with me today. Is there anything else I can do for you? no      Thank you, and have a great day.

## 2023-08-21 NOTE — PROGRESS NOTES
Subjective:     Encounter Date:08/21/2023    Primary Care Physician: Rishi Baker MD      Patient ID: Reba Kong is a 72 y.o. female.    Chief Complaint:Palpitations    PROBLEM LIST:  Chest discomfort  HH/GERD  Hypothyroidsim  Post COVID lung issues  Surgeries:  Hysterectomy , total  Colon polypectomy       Allergies   Allergen Reactions    Penicillins Rash         Current Outpatient Medications:     acetaminophen (TYLENOL) 650 MG 8 hr tablet, Take  by mouth As Needed., Disp: , Rfl:     hydrOXYzine (ATARAX) 10 MG tablet, Take  by mouth As Needed., Disp: , Rfl:     levothyroxine (SYNTHROID, LEVOTHROID) 150 MCG tablet, Take 1 tablet by mouth Daily., Disp: , Rfl:     sertraline (ZOLOFT) 25 MG tablet, Take 1 tablet by mouth Daily., Disp: , Rfl:     triamterene-hydrochlorothiazide (DYAZIDE) 37.5-25 MG per capsule, Take 1 capsule by mouth Every Morning., Disp: , Rfl:         History of Present Illness    Patient is a 72-year-old  female who presents today for further evaluation of intermittent chest discomfort.  She has no previous history of coronary disease.  No previous ischemic evaluation.  Patient notes over the last few months some intermittent, predominantly nocturnal, chest discomfort.  Noted more whenever she lays down.  Also notes tachypalpitations associated with this.  Does not note much with activity.  No reported syncope, near syncope.  Has occasional lower extremity edema which is improved with elevation of her extremities.  She notes many of her symptoms were worse whenever she was dealing with acute health issues really regarding her .  Over the last few weeks have been less frequent.  However, given her advancing age and family history of coronary disease she thought she should have further evaluation.  Her symptoms have been resolving over the last several weeks as her 's health has improved    The following portions of the patient's history were reviewed and  "updated as appropriate: allergies, current medications, past family history, past medical history, past social history, past surgical history and problem list.    Family History   Problem Relation Age of Onset    Lung cancer Mother     Leukemia Father     Heart failure Brother     Breast cancer Neg Hx     Ovarian cancer Neg Hx     Endometrial cancer Neg Hx        Social History     Tobacco Use    Smoking status: Never    Smokeless tobacco: Never   Vaping Use    Vaping Use: Never used   Substance Use Topics    Alcohol use: Yes     Alcohol/week: 4.0 - 7.0 standard drinks     Types: 4 - 7 Glasses of wine per week    Drug use: Never         Review of Systems   Constitutional: Positive for malaise/fatigue. Negative for fever.   HENT:  Negative for nosebleeds.    Eyes:  Negative for redness and visual disturbance.   Cardiovascular:  Positive for leg swelling. Negative for orthopnea, palpitations and paroxysmal nocturnal dyspnea.   Respiratory:  Positive for shortness of breath. Negative for cough, snoring, sputum production and wheezing.    Hematologic/Lymphatic: Negative for bleeding problem.   Skin:  Negative for flushing, itching and rash.   Musculoskeletal:  Negative for falls, joint pain and muscle cramps.   Gastrointestinal:  Negative for abdominal pain, diarrhea, heartburn, nausea and vomiting.   Genitourinary:  Negative for hematuria.   Neurological:  Negative for excessive daytime sleepiness, dizziness, headaches, tremors and weakness.   Psychiatric/Behavioral:  Negative for substance abuse. The patient is not nervous/anxious.         Objective:   /60 (BP Location: Right arm, Patient Position: Sitting)   Pulse 50   Ht 172.7 cm (68\")   Wt 77.3 kg (170 lb 6.4 oz)   SpO2 96%   BMI 25.91 kg/mý         Vitals reviewed.   Constitutional:       Appearance: Healthy appearance. Well-developed and not in distress.   Eyes:      Conjunctiva/sclera: Conjunctivae normal.      Pupils: Pupils are equal, round, and " reactive to light.   HENT:      Head: Normocephalic and atraumatic.    Mouth/Throat:      Pharynx: Oropharynx is clear.   Neck:      Thyroid: Thyroid normal. No thyromegaly.      Vascular: Normal carotid pulses. No carotid bruit or JVD. JVD normal.      Lymphadenopathy: No cervical adenopathy.   Pulmonary:      Effort: No respiratory distress.      Breath sounds: No wheezing. No rales.   Chest:      Chest wall: Not tender to palpatation.   Cardiovascular:      Normal rate. Regular rhythm.      No gallop.    Pulses:     Carotid: 2+ bilaterally.     Dorsalis pedis: 2+ bilaterally.     Posterior tibial: 2+ bilaterally.  Edema:     Peripheral edema absent.   Abdominal:      General: There is no distension or abdominal bruit.      Palpations: There is no abdominal mass.      Tenderness: There is no abdominal tenderness. There is no rebound.   Musculoskeletal:         General: No tenderness or deformity.      Extremities: No clubbing present.Skin:     General: Skin is warm and dry. There is no cyanosis.      Findings: No rash.   Neurological:      Mental Status: Alert, oriented to person, place, and time and oriented to person, place and time.         ECG 12 Lead    Date/Time: 8/21/2023 9:25 AM  Performed by: Shaheed Mcallister MD  Authorized by: Shaheed Mcallister MD   Comparison: compared with previous ECG from 6/21/2021  Similar to previous ECG  Rhythm: sinus bradycardia              Assessment:   Assessment & Plan      Diagnoses and all orders for this visit:    1. Bradycardia, sinus (Primary)  -     ECG 12 Lead      1.  Nocturnal tachypalpitations.  Resolving.  None during the day.  Most consistent with stress.  2.  Nocturnal chest burning, most consistent with GERD.  No exertional angina.  Normal stress test 3 years ago per patient.  Normal echo earlier this year.  3.  Unknown lipid status    Recommendations:  1.  Discussed options of work-up with patient.  As her symptoms are improving, and she has no exertional  symptoms we will defer work-up at this time.  She understands if she develops daytime tachypalpitations, or any exertional chest discomfort to contact her office for a work-up at that time.  2.  Check lipids through primary care physician's office  3.  Discussed primary prudent preventative lifestyle changes       Peace CARRILLO scribed portions of this dictation for Dr. Shaheed Mcallister.     Advance Care Planning   ACP discussion was held with the patient during this visit. Patient does not have an advance directive, declines further assistance.      I have seen and examined the patient, I have reviewed the note, discussed the case with the advance practice clinician, made necessary changes and I agree with the final note.    Shaheed Mcallister MD  08/21/23  10:05 EDT      Dictated utilizing Dragon dictation

## 2023-09-22 ENCOUNTER — HOSPITAL ENCOUNTER (EMERGENCY)
Facility: HOSPITAL | Age: 73
Discharge: HOME | End: 2023-09-23
Attending: EMERGENCY MEDICINE
Payer: MEDICARE

## 2023-09-22 ENCOUNTER — APPOINTMENT (OUTPATIENT)
Dept: GENERAL RADIOLOGY | Facility: HOSPITAL | Age: 73
End: 2023-09-22
Payer: MEDICARE

## 2023-09-22 DIAGNOSIS — I48.0 PAROXYSMAL ATRIAL FIBRILLATION: Primary | ICD-10-CM

## 2023-09-22 DIAGNOSIS — I48.91 ATRIAL FIBRILLATION WITH RAPID VENTRICULAR RESPONSE: ICD-10-CM

## 2023-09-22 LAB
ALBUMIN SERPL-MCNC: 4.7 G/DL (ref 3.5–5.2)
ALBUMIN/GLOB SERPL: 1.7 G/DL
ALP SERPL-CCNC: 113 U/L (ref 39–117)
ALT SERPL W P-5'-P-CCNC: 29 U/L (ref 1–33)
ANION GAP SERPL CALCULATED.3IONS-SCNC: 15 MMOL/L (ref 5–15)
AST SERPL-CCNC: 28 U/L (ref 1–32)
BASOPHILS # BLD AUTO: 0.03 10*3/MM3 (ref 0–0.2)
BASOPHILS NFR BLD AUTO: 0.5 % (ref 0–1.5)
BILIRUB SERPL-MCNC: 0.2 MG/DL (ref 0–1.2)
BUN SERPL-MCNC: 25 MG/DL (ref 8–23)
BUN/CREAT SERPL: 27.5 (ref 7–25)
CALCIUM SPEC-SCNC: 9.8 MG/DL (ref 8.6–10.5)
CHLORIDE SERPL-SCNC: 106 MMOL/L (ref 98–107)
CO2 SERPL-SCNC: 24 MMOL/L (ref 22–29)
CREAT SERPL-MCNC: 0.91 MG/DL (ref 0.57–1)
DEPRECATED RDW RBC AUTO: 40.4 FL (ref 37–54)
EGFRCR SERPLBLD CKD-EPI 2021: 68 ML/MIN/1.73
EOSINOPHIL # BLD AUTO: 0.22 10*3/MM3 (ref 0–0.4)
EOSINOPHIL NFR BLD AUTO: 3.9 % (ref 0.3–6.2)
ERYTHROCYTE [DISTWIDTH] IN BLOOD BY AUTOMATED COUNT: 12 % (ref 12.3–15.4)
ETHANOL BLD-MCNC: <10 MG/DL (ref 0–10)
GLOBULIN UR ELPH-MCNC: 2.7 GM/DL
GLUCOSE SERPL-MCNC: 102 MG/DL (ref 65–99)
HCT VFR BLD AUTO: 42.1 % (ref 34–46.6)
HGB BLD-MCNC: 14 G/DL (ref 12–15.9)
IMM GRANULOCYTES # BLD AUTO: 0.02 10*3/MM3 (ref 0–0.05)
IMM GRANULOCYTES NFR BLD AUTO: 0.4 % (ref 0–0.5)
LYMPHOCYTES # BLD AUTO: 1.54 10*3/MM3 (ref 0.7–3.1)
LYMPHOCYTES NFR BLD AUTO: 27.5 % (ref 19.6–45.3)
MAGNESIUM SERPL-MCNC: 1.9 MG/DL (ref 1.6–2.4)
MCH RBC QN AUTO: 30.4 PG (ref 26.6–33)
MCHC RBC AUTO-ENTMCNC: 33.3 G/DL (ref 31.5–35.7)
MCV RBC AUTO: 91.3 FL (ref 79–97)
MONOCYTES # BLD AUTO: 0.45 10*3/MM3 (ref 0.1–0.9)
MONOCYTES NFR BLD AUTO: 8 % (ref 5–12)
NEUTROPHILS NFR BLD AUTO: 3.34 10*3/MM3 (ref 1.7–7)
NEUTROPHILS NFR BLD AUTO: 59.7 % (ref 42.7–76)
NRBC BLD AUTO-RTO: 0 /100 WBC (ref 0–0.2)
NT-PROBNP SERPL-MCNC: 96.6 PG/ML (ref 0–900)
PLATELET # BLD AUTO: 219 10*3/MM3 (ref 140–450)
PMV BLD AUTO: 10.9 FL (ref 6–12)
POTASSIUM SERPL-SCNC: 3.8 MMOL/L (ref 3.5–5.2)
PROT SERPL-MCNC: 7.4 G/DL (ref 6–8.5)
RBC # BLD AUTO: 4.61 10*6/MM3 (ref 3.77–5.28)
SODIUM SERPL-SCNC: 145 MMOL/L (ref 136–145)
TROPONIN T SERPL HS-MCNC: 18 NG/L
TSH SERPL DL<=0.05 MIU/L-ACNC: 0.02 UIU/ML (ref 0.27–4.2)
WBC NRBC COR # BLD: 5.6 10*3/MM3 (ref 3.4–10.8)

## 2023-09-22 PROCEDURE — 99284 EMERGENCY DEPT VISIT MOD MDM: CPT

## 2023-09-22 PROCEDURE — 80053 COMPREHEN METABOLIC PANEL: CPT

## 2023-09-22 PROCEDURE — 93005 ELECTROCARDIOGRAM TRACING: CPT

## 2023-09-22 PROCEDURE — 83880 ASSAY OF NATRIURETIC PEPTIDE: CPT

## 2023-09-22 PROCEDURE — 96374 THER/PROPH/DIAG INJ IV PUSH: CPT

## 2023-09-22 PROCEDURE — 83735 ASSAY OF MAGNESIUM: CPT

## 2023-09-22 PROCEDURE — 84484 ASSAY OF TROPONIN QUANT: CPT

## 2023-09-22 PROCEDURE — 85025 COMPLETE CBC W/AUTO DIFF WBC: CPT

## 2023-09-22 PROCEDURE — 82077 ASSAY SPEC XCP UR&BREATH IA: CPT | Performed by: EMERGENCY MEDICINE

## 2023-09-22 PROCEDURE — 71045 X-RAY EXAM CHEST 1 VIEW: CPT

## 2023-09-22 PROCEDURE — 84443 ASSAY THYROID STIM HORMONE: CPT

## 2023-09-22 RX ORDER — DILTIAZEM HYDROCHLORIDE 5 MG/ML
10 INJECTION INTRAVENOUS ONCE
Status: COMPLETED | OUTPATIENT
Start: 2023-09-22 | End: 2023-09-22

## 2023-09-22 RX ORDER — SODIUM CHLORIDE 0.9 % (FLUSH) 0.9 %
10 SYRINGE (ML) INJECTION AS NEEDED
Status: DISCONTINUED | OUTPATIENT
Start: 2023-09-22 | End: 2023-09-23 | Stop reason: HOSPADM

## 2023-09-22 RX ADMIN — DILTIAZEM HYDROCHLORIDE 10 MG: 5 INJECTION INTRAVENOUS at 23:15

## 2023-09-22 RX ADMIN — SODIUM CHLORIDE 1000 ML: 9 INJECTION, SOLUTION INTRAVENOUS at 23:15

## 2023-09-23 VITALS
SYSTOLIC BLOOD PRESSURE: 120 MMHG | TEMPERATURE: 98.1 F | RESPIRATION RATE: 16 BRPM | DIASTOLIC BLOOD PRESSURE: 58 MMHG | HEART RATE: 63 BPM | HEIGHT: 68 IN | BODY MASS INDEX: 26.07 KG/M2 | WEIGHT: 172 LBS | OXYGEN SATURATION: 94 %

## 2023-09-23 LAB
HOLD SPECIMEN: NORMAL
QT INTERVAL: 314 MS
QT INTERVAL: 394 MS
QTC INTERVAL: 425 MS
QTC INTERVAL: 465 MS
TROPONIN T SERPL HS-MCNC: 16 NG/L
WHOLE BLOOD HOLD COAG: NORMAL
WHOLE BLOOD HOLD SPECIMEN: NORMAL

## 2023-09-23 PROCEDURE — 93005 ELECTROCARDIOGRAM TRACING: CPT | Performed by: EMERGENCY MEDICINE

## 2023-09-23 PROCEDURE — 84484 ASSAY OF TROPONIN QUANT: CPT | Performed by: EMERGENCY MEDICINE

## 2023-09-23 PROCEDURE — 36415 COLL VENOUS BLD VENIPUNCTURE: CPT

## 2023-09-23 NOTE — DISCHARGE INSTRUCTIONS
Avoid drinking alcohol until cleared by cardiology.  Start taking the Eliquis as prescribed.  Decrease levothyroxine from 175 mcg daily to 150 mcg daily.  Return to the emergency department for any new, concerning, or worsening symptoms.

## 2023-09-23 NOTE — ED PROVIDER NOTES
Subjective   History of Present Illness  72-year-old female presents to the emergency department with concerns about sensation of her heart racing.  Patient reports she has had intermittent similar symptoms for the past 2 months, maybe longer.  She does not have any known history of atrial fibrillation or coronary artery disease.  She denies tobacco use.  She has not had an echocardiogram recently.  When the episodes occur she has a sensation of throat burning.  She denies chest pain recently.  She notices her symptoms more when she tries to go to sleep at night.  She did have 1 cocktail with dinner this evening.  She denies history of thyroid disease.  She has seen Dr. Mcallister of cardiology in the past.    Review of Systems   Constitutional:  Negative for diaphoresis and fever.   Eyes:  Negative for photophobia and discharge.   Respiratory:  Negative for shortness of breath and stridor.    Cardiovascular:  Positive for palpitations. Negative for chest pain.   Gastrointestinal:  Negative for vomiting.   Neurological:  Negative for speech difficulty.     Past Medical History:   Diagnosis Date    Hiatal hernia with GERD     Hyperlipidemia     Hypothyroidism     PUD (peptic ulcer disease) 2015       Allergies   Allergen Reactions    Penicillins Rash       Past Surgical History:   Procedure Laterality Date    COLONOSCOPY W/ POLYPECTOMY      ENDOSCOPY  2015    HYSTERECTOMY  2003    OOPHORECTOMY         Family History   Problem Relation Age of Onset    Lung cancer Mother     Leukemia Father     Heart failure Brother     Breast cancer Neg Hx     Ovarian cancer Neg Hx     Endometrial cancer Neg Hx        Social History     Socioeconomic History    Marital status:     Number of children: 1   Tobacco Use    Smoking status: Never    Smokeless tobacco: Never   Vaping Use    Vaping Use: Never used   Substance and Sexual Activity    Alcohol use: Yes     Alcohol/week: 4.0 - 7.0 standard drinks     Types: 4 - 7 Glasses of  wine per week    Drug use: Never    Sexual activity: Defer           Objective   Physical Exam  Vitals and nursing note reviewed.   Constitutional:       General: She is not in acute distress.     Appearance: She is not diaphoretic.   Eyes:      General: No scleral icterus.  Cardiovascular:      Heart sounds: No murmur heard.    No friction rub. No gallop.      Comments: S1, S2, irregularly irregular rhythm, tachycardic rate.  Pulmonary:      Effort: Pulmonary effort is normal. No respiratory distress.      Breath sounds: Normal breath sounds. No stridor. No wheezing, rhonchi or rales.   Skin:     General: Skin is warm and dry.      Coloration: Skin is not jaundiced.   Neurological:      Mental Status: She is alert.      Comments: Normal speech, no dysarthria.                  ED Course  ED Course as of 09/23/23 0348   Sat Sep 23, 2023   0341 Results and plan discussed with the patient and her daughter at the bedside, all questions addressed.  In June she was increased from 150 to 175 mcg of levothyroxine daily.  I advised her to decrease the dose back down to 150 mcg of levothyroxine daily.  She states that she has plenty of that and will do that.  Risks and benefits of Eliquis were discussed, and I gave the patient handout about Eliquis to review.  I gave her a free 30-day trial of the Eliquis. [LD]      ED Course User Index  [LD] Aminata Camara MD           Patient cardioverted from atrial fibrillation to normal sinus rhythm during her emergency department course after IV diltiazem 10 mg and IV fluids.       [id number] Score: 2                          Medical Decision Making  Differential diagnosis includes paroxysmal atrial fibrillation, hyperthyroidism, overcorrected hypothyroidism, electrolyte abnormality, dehydration, and others.    Problems Addressed:  Atrial fibrillation with rapid ventricular response: complicated acute illness or injury  Paroxysmal atrial fibrillation: complicated acute illness or  injury    Amount and/or Complexity of Data Reviewed  External Data Reviewed: notes.     Details: I reviewed Dr. Bullock's cardiology office note from August 21, 2023.  Labs: ordered. Decision-making details documented in ED Course.  Radiology: ordered. Decision-making details documented in ED Course.     Details: No acute process per radiologist report.  ECG/medicine tests: ordered and independent interpretation performed. Decision-making details documented in ED Course.     Details: EKG at 2225 shows atrial fibrillation with rapid ventricular response at a rate of 132 bpm, normal intervals, nonspecific ST-T wave changes.        Repeat EKG at 0020 shows normal sinus rhythm at a rate of 70 bpm, normal intervals, no acute ischemic changes.  Discussion of management or test interpretation with external provider(s): Case discussed with cardiologist on-call, Dr. Romero, who recommends starting Eliquis, agrees with the plan to titrate down the levothyroxine, and recommends outpatient follow-up with Dr. Bush, patient may be discharged home today.    Risk  Prescription drug management.      Recent Results (from the past 24 hour(s))   ECG 12 Lead ED Triage Standing Order; Dysrhythmia    Collection Time: 09/22/23 10:25 PM   Result Value Ref Range    QT Interval 314 ms    QTC Interval 465 ms   Comprehensive Metabolic Panel    Collection Time: 09/22/23 11:03 PM    Specimen: Blood   Result Value Ref Range    Glucose 102 (H) 65 - 99 mg/dL    BUN 25 (H) 8 - 23 mg/dL    Creatinine 0.91 0.57 - 1.00 mg/dL    Sodium 145 136 - 145 mmol/L    Potassium 3.8 3.5 - 5.2 mmol/L    Chloride 106 98 - 107 mmol/L    CO2 24.0 22.0 - 29.0 mmol/L    Calcium 9.8 8.6 - 10.5 mg/dL    Total Protein 7.4 6.0 - 8.5 g/dL    Albumin 4.7 3.5 - 5.2 g/dL    ALT (SGPT) 29 1 - 33 U/L    AST (SGOT) 28 1 - 32 U/L    Alkaline Phosphatase 113 39 - 117 U/L    Total Bilirubin 0.2 0.0 - 1.2 mg/dL    Globulin 2.7 gm/dL    A/G Ratio 1.7 g/dL    BUN/Creatinine Ratio 27.5  (H) 7.0 - 25.0    Anion Gap 15.0 5.0 - 15.0 mmol/L    eGFR 68.0 >60.0 mL/min/1.73   Magnesium    Collection Time: 09/22/23 11:03 PM    Specimen: Blood   Result Value Ref Range    Magnesium 1.9 1.6 - 2.4 mg/dL   Single High Sensitivity Troponin T    Collection Time: 09/22/23 11:03 PM    Specimen: Blood   Result Value Ref Range    HS Troponin T 18 (H) <10 ng/L   TSH    Collection Time: 09/22/23 11:03 PM    Specimen: Blood   Result Value Ref Range    TSH 0.017 (L) 0.270 - 4.200 uIU/mL   BNP    Collection Time: 09/22/23 11:03 PM    Specimen: Blood   Result Value Ref Range    proBNP 96.6 0.0 - 900.0 pg/mL   Green Top (Gel)    Collection Time: 09/22/23 11:03 PM   Result Value Ref Range    Extra Tube Hold for add-ons.    Lavender Top    Collection Time: 09/22/23 11:03 PM   Result Value Ref Range    Extra Tube hold for add-on    Gold Top - SST    Collection Time: 09/22/23 11:03 PM   Result Value Ref Range    Extra Tube Hold for add-ons.    Gray Top    Collection Time: 09/22/23 11:03 PM   Result Value Ref Range    Extra Tube Hold for add-ons.    Light Blue Top    Collection Time: 09/22/23 11:03 PM   Result Value Ref Range    Extra Tube Hold for add-ons.    CBC Auto Differential    Collection Time: 09/22/23 11:03 PM    Specimen: Blood   Result Value Ref Range    WBC 5.60 3.40 - 10.80 10*3/mm3    RBC 4.61 3.77 - 5.28 10*6/mm3    Hemoglobin 14.0 12.0 - 15.9 g/dL    Hematocrit 42.1 34.0 - 46.6 %    MCV 91.3 79.0 - 97.0 fL    MCH 30.4 26.6 - 33.0 pg    MCHC 33.3 31.5 - 35.7 g/dL    RDW 12.0 (L) 12.3 - 15.4 %    RDW-SD 40.4 37.0 - 54.0 fl    MPV 10.9 6.0 - 12.0 fL    Platelets 219 140 - 450 10*3/mm3    Neutrophil % 59.7 42.7 - 76.0 %    Lymphocyte % 27.5 19.6 - 45.3 %    Monocyte % 8.0 5.0 - 12.0 %    Eosinophil % 3.9 0.3 - 6.2 %    Basophil % 0.5 0.0 - 1.5 %    Immature Grans % 0.4 0.0 - 0.5 %    Neutrophils, Absolute 3.34 1.70 - 7.00 10*3/mm3    Lymphocytes, Absolute 1.54 0.70 - 3.10 10*3/mm3    Monocytes, Absolute 0.45 0.10 -  0.90 10*3/mm3    Eosinophils, Absolute 0.22 0.00 - 0.40 10*3/mm3    Basophils, Absolute 0.03 0.00 - 0.20 10*3/mm3    Immature Grans, Absolute 0.02 0.00 - 0.05 10*3/mm3    nRBC 0.0 0.0 - 0.2 /100 WBC   Ethanol    Collection Time: 09/22/23 11:03 PM    Specimen: Blood   Result Value Ref Range    Ethanol <10 0 - 10 mg/dL   ECG 12 Lead Rhythm Change    Collection Time: 09/23/23 12:20 AM   Result Value Ref Range    QT Interval 394 ms    QTC Interval 425 ms   Single High Sensitivity Troponin T    Collection Time: 09/23/23  1:26 AM    Specimen: Blood   Result Value Ref Range    HS Troponin T 16 (H) <10 ng/L     Note: In addition to lab results from this visit, the labs listed above may include labs taken at another facility or during a different encounter within the last 24 hours. Please correlate lab times with ED admission and discharge times for further clarification of the services performed during this visit.    XR Chest 1 View   Final Result   Impression:   No acute cardiopulmonary process.         Electronically Signed: Melonie Cavazos MD     9/22/2023 11:15 PM EDT     Workstation ID: [id number]        Vitals:    09/23/23 0200 09/23/23 0215 09/23/23 0230 09/23/23 0245   BP: 132/59 130/56 135/59 120/58   Pulse: 72 67 65 63   Resp:       Temp:       TempSrc:       SpO2: 94% 94% 94% 94%   Weight:       Height:         Medications   sodium chloride 0.9 % flush 10 mL (has no administration in time range)   sodium chloride 0.9 % bolus 1,000 mL (0 mL Intravenous Stopped 9/23/23 0148)   dilTIAZem (CARDIZEM) injection 10 mg (10 mg Intravenous Given 9/22/23 2315)     ECG/EMG Results (last 24 hours)       Procedure Component Value Units Date/Time    ECG 12 Lead ED Triage Standing Order; Dysrhythmia [[id number]] Collected: 09/22/23 2225     Updated: 09/22/23 2225     QT Interval 314 ms      QTC Interval 465 ms     Narrative:      Test Reason : ED Triage Standing Order~  Blood Pressure :   */*   mmHG  Vent. Rate : 132 BPM     Atrial  Rate : 170 BPM     P-R Int :   * ms          QRS Dur :  84 ms      QT Int : 314 ms       P-R-T Axes :   *  39  -3 degrees     QTc Int : 465 ms    Atrial fibrillation with rapid ventricular response  Nonspecific ST and T wave abnormality  Abnormal ECG  No previous ECGs available    Referred By: JAMAL           Confirmed By:     ECG 12 Lead Rhythm Change [[id number]] Collected: 09/23/23 0020     Updated: 09/23/23 0017     QT Interval 394 ms      QTC Interval 425 ms     Narrative:      Test Reason : Rhythm Change  Blood Pressure :   */*   mmHG  Vent. Rate :  70 BPM     Atrial Rate :  70 BPM     P-R Int : 116 ms          QRS Dur :  90 ms      QT Int : 394 ms       P-R-T Axes :  37  43  43 degrees     QTc Int : 425 ms    Normal sinus rhythm  Normal ECG  When compared with ECG of 22-SEP-2023 22:25, (Unconfirmed)  Sinus rhythm has replaced Atrial fibrillation  Vent. rate has decreased BY  62 BPM  ST no longer depressed in Inferior leads  ST no longer depressed in Anterolateral leads  Nonspecific T wave abnormality, improved in Inferior leads  Nonspecific T wave abnormality no longer evident in Lateral leads    Referred By: RAMSEY           Confirmed By:           ECG 12 Lead Rhythm Change   Preliminary Result   Test Reason : Rhythm Change   Blood Pressure :   */*   mmHG   Vent. Rate :  70 BPM     Atrial Rate :  70 BPM      P-R Int : 116 ms          QRS Dur :  90 ms       QT Int : 394 ms       P-R-T Axes :  37  43  43 degrees      QTc Int : 425 ms      Normal sinus rhythm   Normal ECG   When compared with ECG of 22-SEP-2023 22:25, (Unconfirmed)   Sinus rhythm has replaced Atrial fibrillation   Vent. rate has decreased BY  62 BPM   ST no longer depressed in Inferior leads   ST no longer depressed in Anterolateral leads   Nonspecific T wave abnormality, improved in Inferior leads   Nonspecific T wave abnormality no longer evident in Lateral leads      Referred By: RAMSEY           Confirmed By:       ECG 12 Lead ED Triage Standing  Order; Dysrhythmia   Preliminary Result   Test Reason : ED Triage Standing Order~   Blood Pressure :   */*   mmHG   Vent. Rate : 132 BPM     Atrial Rate : 170 BPM      P-R Int :   * ms          QRS Dur :  84 ms       QT Int : 314 ms       P-R-T Axes :   *  39  -3 degrees      QTc Int : 465 ms      Atrial fibrillation with rapid ventricular response   Nonspecific ST and T wave abnormality   Abnormal ECG   No previous ECGs available      Referred By: EDMD           Confirmed By:               Final diagnoses:   Paroxysmal atrial fibrillation   Atrial fibrillation with rapid ventricular response       ED Disposition  ED Disposition       ED Disposition   Discharge    Condition   Stable    Comment   --               Rishi Baker MD  [address]    [place] [id number]  [phone]    Schedule an appointment as soon as possible for a visit in 1 week  primary care provider    Shaheed Mcallister MD  [address]   [facility] [address]  [phone]    Schedule an appointment as soon as possible for a visit in 3 days  Call Monday morning and let them know you were seen in the ER and have a-fib. You will need an echocardiogram and a follow up appointment per discussion with Dr. Romero, cardiologist on call.         Medication List        New Prescriptions      apixaban 5 MG tablet tablet  Commonly known as: ELIQUIS  Take 1 tablet by mouth 2 (Two) Times a Day.               Where to Get Your Medications        These medications were sent to [facility] [address] [place] [address] - [address] [facility] - [phone] [facility] [phone]   [address] [facility] [address]      Phone: [phone]   apixaban 5 MG tablet tablet            Aminata Camara MD  09/23/23 4616

## 2023-09-25 ENCOUNTER — TELEPHONE (OUTPATIENT)
Dept: CARDIOLOGY | Facility: CLINIC | Age: 73
End: 2023-09-25

## 2023-09-25 NOTE — TELEPHONE ENCOUNTER
Spoke with patient, advised of recommendations.  Understanding verbalized.  She prefers to make appt with  at this time.

## 2023-09-25 NOTE — TELEPHONE ENCOUNTER
Caller: Reba Kong    Relationship to patient: Self    Best call back number: [phone]    Type of visit: FOLLOW UP     Requested date: ASAP     Additional notes:PATIENT WAS IN THE EMERGENCY ROOM ON FRIDAY 09/23/23 AND WAS TOLD SHE HAS A-FIB. PATIENT WAS ADVISED TO FOLLOW UP WITH DR. PERKINS AS SOON AS POSSIBLE.     PATIENT IS NOT CURRENTLY HAVING ANY CARDIAC ISSUES

## 2023-09-29 NOTE — PROGRESS NOTES
Subjective:     Encounter Date:10/02/2023    Primary Care Physician: Rishi Baker MD      Patient ID: Reba Kong is a 72 y.o. female.    Chief Complaint:Atrial Fibrillation (SP ER Follow Up)    PROBLEM LIST:  Paroxysmal atrial fibrillation  Diagnosed 9/2023 s/p ECV to SR.  CHADSVASC- 2, started on Eliquis  HH/GERD  Hypothyroidsim  Post COVID lung issues  Surgeries:  Hysterectomy , total  Colon polypectomy       Allergies   Allergen Reactions    Penicillins Rash         Current Outpatient Medications:     acetaminophen (TYLENOL) 650 MG 8 hr tablet, Take  by mouth As Needed., Disp: , Rfl:     apixaban (ELIQUIS) 5 MG tablet tablet, Take 1 tablet by mouth 2 (Two) Times a Day., Disp: 60 tablet, Rfl: 5    hydrOXYzine (ATARAX) 10 MG tablet, Take  by mouth As Needed., Disp: , Rfl:     levothyroxine (SYNTHROID, LEVOTHROID) 150 MCG tablet, Take 1 tablet by mouth Daily., Disp: , Rfl:     metoprolol tartrate (LOPRESSOR) 25 MG tablet, Take 0.5 tablets by mouth 2 (Two) Times a Day., Disp: 60 tablet, Rfl: 5    sertraline (ZOLOFT) 25 MG tablet, Take 1 tablet by mouth Daily., Disp: , Rfl:     triamterene-hydrochlorothiazide (DYAZIDE) 37.5-25 MG per capsule, Take 1 capsule by mouth Every Morning., Disp: , Rfl:         History of Present Illness    Patient is a 72-year-old  female who we are seeing today for follow-up of paroxysmal atrial fibrillation.  Patient was diagnosed with this in September.  She presented to the emergency department at which time she was noted to be in atrial fibrillation with rapid ventricular response.  She underwent diltiazem administration and eventual cardioversion.  She was then subsequently released and placed on Eliquis.  Notes that she has had 1 other more prolonged episode that lasted for about 2 hours that she did not present to the hospital for.  She phoned in our office and she was placed on metoprolol as well.  Notes since starting metoprolol she is still had some  "occasional palpitations but these are very brief lasting only seconds.  Has not had any prolonged episodes of atrial fibrillation.  She denies any chest pain, pressure, tightness.  Of note, her thyroid medication was adjusted as well at time of presentation.  No reported syncope, presyncope.  Notes to be tolerating metoprolol.  No dizziness.  Notes some mild fatigue.    The following portions of the patient's history were reviewed and updated as appropriate: allergies, current medications, past family history, past medical history, past social history, past surgical history and problem list.      Social History     Tobacco Use    Smoking status: Never    Smokeless tobacco: Never   Vaping Use    Vaping Use: Never used   Substance Use Topics    Alcohol use: Yes     Alcohol/week: 4.0 - 7.0 standard drinks     Types: 4 - 7 Glasses of wine per week    Drug use: Never         ROS       Objective:   /68 (BP Location: Right arm, Patient Position: Sitting, Cuff Size: Adult)   Pulse 50   Ht 172.7 cm (68\")   Wt 76.7 kg (169 lb)   SpO2 98%   BMI 25.70 kg/m²         Vitals reviewed.   Constitutional:       Appearance: Well-developed and not in distress.   Neck:      Vascular: No JVD.      Trachea: No tracheal deviation.   Pulmonary:      Effort: Pulmonary effort is normal.      Breath sounds: Normal breath sounds.   Cardiovascular:      Bradycardia present. Regular rhythm.      Murmurs: There is no murmur.   Edema:     Peripheral edema absent.   Musculoskeletal:         General: No deformity. Skin:     General: Skin is warm and dry.   Neurological:      Mental Status: Alert and oriented to person, place, and time.       Procedures          Assessment:   Assessment & Plan      Diagnoses and all orders for this visit:    1. Paroxysmal atrial fibrillation (Primary), recent diagnosis and status post recent cardioversion.  Currently on Eliquis and Lopressor.    2. Bradycardia, sinus.  Stable.  " Asymptomatic.      Plan:  Patient is overall stable from cardiac standpoint at this time.  Continue current medications.  Educated patient regarding atrial fibrillation and need for anticoagulation.  We will check echocardiogram in the near term secondary to new diagnosis of A-fib.  Follow-up with primary care for thyroid medication adjustments.  Discussed with patient should she continue to have prolonged episodes that are symptomatic to contact our office for further evaluation.  Otherwise, follow-up in 4 months time or sooner if needed.       Peace CARRILLO             Dictated utilizing Dragon dictation

## 2023-10-02 ENCOUNTER — OFFICE VISIT (OUTPATIENT)
Dept: CARDIOLOGY | Facility: CLINIC | Age: 73
End: 2023-10-02
Payer: MEDICARE

## 2023-10-02 VITALS
BODY MASS INDEX: 25.61 KG/M2 | SYSTOLIC BLOOD PRESSURE: 112 MMHG | OXYGEN SATURATION: 98 % | WEIGHT: 169 LBS | DIASTOLIC BLOOD PRESSURE: 68 MMHG | HEIGHT: 68 IN | HEART RATE: 50 BPM

## 2023-10-02 DIAGNOSIS — I48.0 PAROXYSMAL ATRIAL FIBRILLATION: Primary | ICD-10-CM

## 2023-10-02 DIAGNOSIS — R00.1 BRADYCARDIA, SINUS: ICD-10-CM

## 2023-10-02 PROCEDURE — 1160F RVW MEDS BY RX/DR IN RCRD: CPT | Performed by: NURSE PRACTITIONER

## 2023-10-02 PROCEDURE — 1159F MED LIST DOCD IN RCRD: CPT | Performed by: NURSE PRACTITIONER

## 2023-10-02 PROCEDURE — 99213 OFFICE O/P EST LOW 20 MIN: CPT | Performed by: NURSE PRACTITIONER

## 2023-10-02 NOTE — LETTER
October 2, 2023       No Recipients    Patient: Reba Kong   YOB: 1950   Date of Visit: 10/2/2023     Dear Rishi Baker MD:       Thank you for referring Reba Kong to me for evaluation. Below are the relevant portions of my assessment and plan of care.    If you have questions, please do not hesitate to call me. I look forward to following Reba along with you.         Sincerely,        GERARDO Chandra        CC:   No Recipients    Peace Irby APRN  10/02/23 1014  Sign when Signing Visit  Subjective:     Encounter Date:10/02/2023    Primary Care Physician: Rishi Baker MD      Patient ID: Reba Kong is a 72 y.o. female.    Chief Complaint:Atrial Fibrillation (SP ER Follow Up)    PROBLEM LIST:  Paroxysmal atrial fibrillation  Diagnosed 9/2023 s/p ECV to SR.  CHADSVASC- 2, started on Eliquis  HH/GERD  Hypothyroidsim  Post COVID lung issues  Surgeries:  Hysterectomy , total  Colon polypectomy       Allergies   Allergen Reactions   • Penicillins Rash         Current Outpatient Medications:   •  acetaminophen (TYLENOL) 650 MG 8 hr tablet, Take  by mouth As Needed., Disp: , Rfl:   •  apixaban (ELIQUIS) 5 MG tablet tablet, Take 1 tablet by mouth 2 (Two) Times a Day., Disp: 60 tablet, Rfl: 5  •  hydrOXYzine (ATARAX) 10 MG tablet, Take  by mouth As Needed., Disp: , Rfl:   •  levothyroxine (SYNTHROID, LEVOTHROID) 150 MCG tablet, Take 1 tablet by mouth Daily., Disp: , Rfl:   •  metoprolol tartrate (LOPRESSOR) 25 MG tablet, Take 0.5 tablets by mouth 2 (Two) Times a Day., Disp: 60 tablet, Rfl: 5  •  sertraline (ZOLOFT) 25 MG tablet, Take 1 tablet by mouth Daily., Disp: , Rfl:   •  triamterene-hydrochlorothiazide (DYAZIDE) 37.5-25 MG per capsule, Take 1 capsule by mouth Every Morning., Disp: , Rfl:         History of Present Illness    Patient is a 72-year-old  female who we are seeing today for follow-up of paroxysmal atrial fibrillation.  Patient  "was diagnosed with this in September.  She presented to the emergency department at which time she was noted to be in atrial fibrillation with rapid ventricular response.  She underwent diltiazem administration and eventual cardioversion.  She was then subsequently released and placed on Eliquis.  Notes that she has had 1 other more prolonged episode that lasted for about 2 hours that she did not present to the hospital for.  She phoned in our office and she was placed on metoprolol as well.  Notes since starting metoprolol she is still had some occasional palpitations but these are very brief lasting only seconds.  Has not had any prolonged episodes of atrial fibrillation.  She denies any chest pain, pressure, tightness.  Of note, her thyroid medication was adjusted as well at time of presentation.  No reported syncope, presyncope.  Notes to be tolerating metoprolol.  No dizziness.  Notes some mild fatigue.    The following portions of the patient's history were reviewed and updated as appropriate: allergies, current medications, past family history, past medical history, past social history, past surgical history and problem list.      Social History     Tobacco Use   • Smoking status: Never   • Smokeless tobacco: Never   Vaping Use   • Vaping Use: Never used   Substance Use Topics   • Alcohol use: Yes     Alcohol/week: 4.0 - 7.0 standard drinks     Types: 4 - 7 Glasses of wine per week   • Drug use: Never         ROS       Objective:   /68 (BP Location: Right arm, Patient Position: Sitting, Cuff Size: Adult)   Pulse 50   Ht 172.7 cm (68\")   Wt 76.7 kg (169 lb)   SpO2 98%   BMI 25.70 kg/m²         Vitals reviewed.   Constitutional:       Appearance: Well-developed and not in distress.   Neck:      Vascular: No JVD.      Trachea: No tracheal deviation.   Pulmonary:      Effort: Pulmonary effort is normal.      Breath sounds: Normal breath sounds.   Cardiovascular:      Bradycardia present. Regular " rhythm.      Murmurs: There is no murmur.   Edema:     Peripheral edema absent.   Musculoskeletal:         General: No deformity. Skin:     General: Skin is warm and dry.   Neurological:      Mental Status: Alert and oriented to person, place, and time.       Procedures          Assessment:   Assessment & Plan      Diagnoses and all orders for this visit:    1. Paroxysmal atrial fibrillation (Primary), recent diagnosis and status post recent cardioversion.  Currently on Eliquis and Lopressor.    2. Bradycardia, sinus.  Stable.  Asymptomatic.      Plan:  Patient is overall stable from cardiac standpoint at this time.  Continue current medications.  Educated patient regarding atrial fibrillation and need for anticoagulation.  We will check echocardiogram in the near term secondary to new diagnosis of A-fib.  Follow-up with primary care for thyroid medication adjustments.  Discussed with patient should she continue to have prolonged episodes that are symptomatic to contact our office for further evaluation.  Otherwise, follow-up in 4 months time or sooner if needed.       Peace CARRILLO             Dictated utilizing Dragon dictation

## 2023-10-13 NOTE — ADDENDUM NOTE
Addended by: CRYSTAL ROUSE on: 10/28/2022 09:45 AM     Modules accepted: Orders     Advance Care Planning   [facility] - Intensive Care ([place] [address])  Palliative Care   Psychosocial Assessment    Patient Name: Estefani Fam  MRN: [id number]  Admission Date: 10/9/2023  Hospital Length of Stay: 3 days  Code Status: Full Code   Attending Provider: Nadine Gallagher MD  Palliative Care Provider: STEPHAN Grijalva   Primary Care Physician: Dustin Khan MD  Principal Problem:<principal problem not specified>    Reason for Referral: assistance with clarification of goals of care    Present during Interview: patient and spouse/SO.      Primary Language:English   Needed: no      Past Medical Situation:   PMH:   Past Medical History:   Diagnosis Date    Cataract     COPD (chronic obstructive pulmonary disease)     COVID-19     History of COVID-19 1/17/2021    Still with mild dyspnea. Encouraged return to pulmonary rehab Recommend scheduling vaccination when appointment is available.     History of retinal hemorrhage     Lobar pneumonia 11/14/2021    Recurrent in RLL, no endobronchial lesion Needs speech evaluation and MBSS for recurrent aspiration in setting of dysphagia  Will need pulmonary rehab at [facility].    Pathological fracture due to osteoporosis 7/6/2020    Retinal histoplasmosis     Secondary pulmonary arterial hypertension 7/3/2018    Secondary to emphysema and chronic respiratory failure, mild.     Mental Health/Substance Use History: none identified   Risk of Abuse, neglect or exploitation: none identified   Current or Previous Trauma and/or evidence of PTSD: none identified   Non-traditional Health practices: none identified     Understanding of diagnosis and prognosis: patient has good understanding of diagnosis, prognosis is guarded  Experience/Comfort level with health care system: fair     Patients Mental Status: patient is alert and oriented to person, place, and time     Socio-Economic Factors/Resources:  Address: [address] [place] [address]  Phone Number: [phone] (home)     Marital Status:   Household composition: patient lives with her spouse  Children: one son     Patient/Family Strengths/Resilience: patient presents as thoughtful and appreciative   Patient/Family Coping: patient with anxiety when experiencing dyspnea     Activities of Daily Living: patient is mostly independent with activities of daily living  Support Systems-Family & Community (Home Health, HME etc): home O2, hospital bed     Transportation:  yes    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  Self-Care Activities/Hobbies: puzzles, watching TV     History: no    Financial Resources:Medicare      Advanced Care Planning & Legal Concerns:   Advanced Directives/Living Will: no  LaPOST/POLST: no   Planning:  no    Emergency Contacts: Osvaldo / Spouse / [phone]    Spirituality, Culture & Coping Mechanisms:  F- Myranda and Belief: Temple     I - Importance: yes    C - Community/Culture Values: yes     A - Address in Care: yes      Goals/Hopes/Expectations: patient hopes to find a way to manage dyspnea   Fears/Anxiety/Concerns: concern about morphine making her loopy, but agrees to try        Preferences about EOL Environment: (own bed, family nearby, pets, music, etc) tbd      Complicated Bereavement Risk Assessment Tool (CBRAT)  Reference:  [facility] Clinical Practice Group (May 2016). Bereavement Risk Screening and Management Guidelines.  Retrieved from: http://www.[facility].com.au/wp-content/uploads//UPEPI-Saxpwbwfccv-Fmajcwbll-and-Management-Guideline-2016.pdf      Bereaved Client Characteristics   Under 18      no  Was a Twin   no  Young Spouse   no  Elderly Spouse    yes  Isolated    no  Lacks Meaningful Social Support   no  Dissatisfied with help available during illness   no  New to Financial [place] no  New to Decision-Making   no    Illness  Inherited Disorder   no  Stigmatized Disease in the  family/community   no  Lengthy/Burdensome   yes     Bereaved Client's History of Loss   Cumulative Multiple Losses   no  Previous Mental Health Illnesses   no  Current Mental Health Illness   no  Other Significant Health Issues   no   Migrant/Refugee   no Will the Death be:  Sudden or Unexpected   no  Traumatic Circumstances Associated with Death   no  Significant Cultural/Social Burdens as a result of Death   no   Relationship with   Profound Lifelong Partner   yes  Highly Dependent    no  Antagonistic   no  Ambivalent   no  Deeply Connected   yes  Culturally Defined   yes   Risk Factors Scores  0-2  Low  3-5  Moderate  5+  High  All persons scoring moderate to high presume to be at risk**    (** It is acknowledged that protective factors and resilience may outweigh apparent risk factors.      Total Risk Factors Score:   low to moderate       Advance Care Planning     Date: 10/13/2023    [facility]  Palliative provider and this  engaged the patient and spouse   conversation about advance care planning and we specifically addressed what the goals of care would be moving forward, in light of the patient's change in clinical status, specifically worsening of COPD.  We did not specifically address the patient's likely prognosis, which is  guarded .  We explored the patient's values and preferences for future care.  The patient endorses that what is most important right now is to focus on symptom/pain control    Accordingly, we have decided that the best plan to meet the patient's goals includes continuing with treatment Patient hopes to get well enough to get pulmonary rehab in the future.      will follow for support.     Star Beal, [facility]  Palliative Medicine

## 2023-11-08 ENCOUNTER — TELEPHONE (OUTPATIENT)
Dept: CARDIOLOGY | Facility: CLINIC | Age: 73
End: 2023-11-08
Payer: MEDICARE

## 2023-11-08 NOTE — TELEPHONE ENCOUNTER
Caller: Reba Kong    Relationship to patient: Self    Best call back number: [phone]    Chief complaint: MEDICATION REACTION TO HER RESPIRATORY SYSTEM    Type of visit: F/U    Requested date: AFTER ECHO ON 11-20-23       Additional notes:NO DATES AVAILABLE UNTIL 2024. PLEASE CONTACT PATIENT WITH A SUITABLE DATE.

## 2023-11-20 ENCOUNTER — HOSPITAL ENCOUNTER (OUTPATIENT)
Dept: CARDIOLOGY | Facility: HOSPITAL | Age: 73
Discharge: HOME | End: 2023-11-20
Admitting: NURSE PRACTITIONER
Payer: MEDICARE

## 2023-11-20 DIAGNOSIS — I48.0 PAROXYSMAL ATRIAL FIBRILLATION: ICD-10-CM

## 2023-11-20 LAB
BH CV ECHO MEAS - AO MAX PG: 4.4 MMHG
BH CV ECHO MEAS - AO MEAN PG: 2 MMHG
BH CV ECHO MEAS - AO ROOT DIAM: 3.1 CM
BH CV ECHO MEAS - AO V2 MAX: 105 CM/SEC
BH CV ECHO MEAS - AO V2 VTI: 28.7 CM
BH CV ECHO MEAS - AVA(I,D): 2.27 CM2
BH CV ECHO MEAS - EDV(CUBED): 91.1 ML
BH CV ECHO MEAS - EDV(MOD-SP2): 78.5 ML
BH CV ECHO MEAS - EDV(MOD-SP4): 97.9 ML
BH CV ECHO MEAS - EF(MOD-BP): 60 %
BH CV ECHO MEAS - EF(MOD-SP2): 56.4 %
BH CV ECHO MEAS - EF(MOD-SP4): 62.5 %
BH CV ECHO MEAS - ESV(CUBED): 24.4 ML
BH CV ECHO MEAS - ESV(MOD-SP2): 34.2 ML
BH CV ECHO MEAS - ESV(MOD-SP4): 36.7 ML
BH CV ECHO MEAS - FS: 35.6 %
BH CV ECHO MEAS - IVS/LVPW: 1 CM
BH CV ECHO MEAS - IVSD: 0.9 CM
BH CV ECHO MEAS - LA DIMENSION: 3.2 CM
BH CV ECHO MEAS - LAT PEAK E' VEL: 12.2 CM/SEC
BH CV ECHO MEAS - LV MASS(C)D: 132.8 GRAMS
BH CV ECHO MEAS - LV MAX PG: 2.7 MMHG
BH CV ECHO MEAS - LV MEAN PG: 1 MMHG
BH CV ECHO MEAS - LV V1 MAX: 82.8 CM/SEC
BH CV ECHO MEAS - LV V1 VTI: 20.7 CM
BH CV ECHO MEAS - LVIDD: 4.5 CM
BH CV ECHO MEAS - LVIDS: 2.9 CM
BH CV ECHO MEAS - LVOT AREA: 3.1 CM2
BH CV ECHO MEAS - LVOT DIAM: 2 CM
BH CV ECHO MEAS - LVPWD: 0.9 CM
BH CV ECHO MEAS - MED PEAK E' VEL: 9.1 CM/SEC
BH CV ECHO MEAS - MV A MAX VEL: 89.6 CM/SEC
BH CV ECHO MEAS - MV DEC TIME: 0.22 SEC
BH CV ECHO MEAS - MV E MAX VEL: 86.5 CM/SEC
BH CV ECHO MEAS - MV E/A: 0.97
BH CV ECHO MEAS - PA ACC TIME: 0.25 SEC
BH CV ECHO MEAS - PA V2 MAX: 88.2 CM/SEC
BH CV ECHO MEAS - RAP SYSTOLE: 3 MMHG
BH CV ECHO MEAS - RVSP: 31 MMHG
BH CV ECHO MEAS - SV(LVOT): 65 ML
BH CV ECHO MEAS - SV(MOD-SP2): 44.3 ML
BH CV ECHO MEAS - SV(MOD-SP4): 61.2 ML
BH CV ECHO MEAS - TAPSE (>1.6): 2.17 CM
BH CV ECHO MEAS - TR MAX PG: 28.9 MMHG
BH CV ECHO MEAS - TR MAX VEL: 268.5 CM/SEC
BH CV ECHO MEASUREMENTS AVERAGE E/E' RATIO: 8.12
BH CV XLRA - RV BASE: 3.7 CM
BH CV XLRA - RV LENGTH: 4.9 CM
BH CV XLRA - RV MID: 2.8 CM
BH CV XLRA - TDI S': 11.7 CM/SEC
LEFT ATRIUM VOLUME INDEX: 33.9 ML/M2

## 2023-11-20 PROCEDURE — 93306 TTE W/DOPPLER COMPLETE: CPT

## 2023-11-27 ENCOUNTER — OFFICE VISIT (OUTPATIENT)
Dept: CARDIOLOGY | Facility: CLINIC | Age: 73
End: 2023-11-27
Payer: MEDICARE

## 2023-11-27 VITALS
OXYGEN SATURATION: 97 % | HEIGHT: 68 IN | SYSTOLIC BLOOD PRESSURE: 118 MMHG | WEIGHT: 172 LBS | DIASTOLIC BLOOD PRESSURE: 68 MMHG | BODY MASS INDEX: 26.07 KG/M2 | HEART RATE: 52 BPM

## 2023-11-27 DIAGNOSIS — R00.1 BRADYCARDIA, SINUS: ICD-10-CM

## 2023-11-27 DIAGNOSIS — I48.0 PAROXYSMAL ATRIAL FIBRILLATION: Primary | ICD-10-CM

## 2023-11-27 PROCEDURE — 1160F RVW MEDS BY RX/DR IN RCRD: CPT | Performed by: INTERNAL MEDICINE

## 2023-11-27 PROCEDURE — 99214 OFFICE O/P EST MOD 30 MIN: CPT | Performed by: INTERNAL MEDICINE

## 2023-11-27 PROCEDURE — 1159F MED LIST DOCD IN RCRD: CPT | Performed by: INTERNAL MEDICINE

## 2023-11-27 NOTE — PROGRESS NOTES
[facility] Cardiology  Subjective:     Encounter Date: 11/27/2023      Patient ID: Reba Kong is a 73 y.o. female.    Chief Complaint: Atrial Fibrillation      PROBLEM LIST:  Paroxysmal atrial fibrillation  Diagnosed 9/2023 s/p ECV to SR.  CHADSVASC- 2, started on Eliquis  Echo, 11/20/2023: EF 60%. Normal.  HH/GERD  Hypothyroidsim  Post COVID lung issues  Surgeries:  Hysterectomy , total  Colon polypectomy       History of Present Illness  Reba Kong returns today for a follow up with a history of PAF and cardiac risk factors. Since last visit, patient has been doing well overall from a cardiovascular standpoint. However, she reports she has been experiencing fatigue since starting Eliquis and metoprolol. Patient states she has not had any symptoms of atrial fibrillation since her last ED visit and decreasing her use of synthroid. She questions if she can discontinue her metoprolol and Eliquis and was advised against discontinuation without replacement. Patient reports she will see her PCP in a couple weeks to reassess her usage of synthroid. She denies chest pain, shortness of breath, orthopnea, palpitations, edema, dizziness, and syncope.     Allergies   Allergen Reactions    Penicillins Rash         Current Outpatient Medications:     acetaminophen (TYLENOL) 650 MG 8 hr tablet, Take  by mouth As Needed., Disp: , Rfl:     apixaban (ELIQUIS) 5 MG tablet tablet, Take 1 tablet by mouth 2 (Two) Times a Day., Disp: 60 tablet, Rfl: 5    hydrOXYzine (ATARAX) 10 MG tablet, Take  by mouth As Needed., Disp: , Rfl:     levothyroxine (SYNTHROID, LEVOTHROID) 150 MCG tablet, Take 137 mcg by mouth Daily. No longer on 150 mcg, Disp: , Rfl:     metoprolol tartrate (LOPRESSOR) 25 MG tablet, Take 0.5 tablets by mouth 2 (Two) Times a Day., Disp: 60 tablet, Rfl: 5    sertraline (ZOLOFT) 25 MG tablet, Take 1 tablet by mouth Daily., Disp: , Rfl:     triamterene-hydrochlorothiazide (DYAZIDE)  "37.5-25 MG per capsule, Take 1 capsule by mouth Every Morning., Disp: , Rfl:     The following portions of the patient's history were reviewed and updated as appropriate: allergies, current medications, past family history, past medical history, past social history, past surgical history and problem list.    Review of Systems   Constitutional: Negative.   Cardiovascular:  Negative for chest pain, dyspnea on exertion, leg swelling, palpitations and syncope.   Respiratory: Negative.  Negative for shortness of breath.    Hematologic/Lymphatic: Negative for bleeding problem. Does not bruise/bleed easily.   Skin:  Negative for rash.   Musculoskeletal:  Negative for muscle weakness and myalgias.   Gastrointestinal:  Negative for heartburn, nausea and vomiting.   Neurological:  Negative for dizziness, light-headedness, loss of balance and numbness.          Objective:     Vitals:    11/27/23 0903   BP: 118/68   BP Location: Left arm   Patient Position: Sitting   Cuff Size: Adult   Pulse: 52   SpO2: 97%   Weight: 78 kg (172 lb)   Height: 172.7 cm (68\")         Vitals reviewed.   Constitutional:       Appearance: Well-developed and not in distress.   Neck:      Thyroid: No thyromegaly.      Vascular: No carotid bruit or JVD.   Pulmonary:      Breath sounds: Normal breath sounds.   Cardiovascular:      Regular rhythm.      No gallop. No S3 and S4 gallop.   Pulses:     Intact distal pulses.      Carotid: 2+ bilaterally.     Radial: 2+ bilaterally.  Edema:     Peripheral edema absent.   Abdominal:      General: Bowel sounds are normal.      Palpations: Abdomen is soft. There is no abdominal mass.      Tenderness: There is no abdominal tenderness.   Musculoskeletal:         General: No deformity.      Extremities: No clubbing present.Skin:     General: Skin is warm and dry.      Findings: No rash.   Neurological:      Mental Status: Alert and oriented to person, place, and time.         Lab Review:  Lab Results   Component Value " Date    GLUCOSE 102 (H) 09/22/2023    BUN 25 (H) 09/22/2023    CREATININE 0.91 09/22/2023    BCR 27.5 (H) 09/22/2023    K 3.8 09/22/2023    CO2 24.0 09/22/2023    CALCIUM 9.8 09/22/2023    ALBUMIN 4.7 09/22/2023    ALKPHOS 113 09/22/2023    AST 28 09/22/2023    ALT 29 09/22/2023     Lab Results   Component Value Date    WBC 5.60 09/22/2023    RBC 4.61 09/22/2023    HGB 14.0 09/22/2023    HCT 42.1 09/22/2023    MCV 91.3 09/22/2023     09/22/2023     Lab Results   Component Value Date    TSH 0.017 (L) 09/22/2023       Procedures      Advance Care Planning   ACP discussion was held with the patient during this visit. Patient does not have an advance directive, information provided.           Assessment:   Diagnoses and all orders for this visit:    1. Paroxysmal atrial fibrillation (Primary)    2. Bradycardia, sinus        Impression:  1. Paroxysmal atrial fibrillation (Primary). Stable and asymptomatic. Continue on Eliquis 5 mg BID for stroke prophylaxis. Wean off metoprolol by first decreasing to 12.5 mg daily for 10 days then discontinue due to fatigue.    2. Bradycardia, sinus. Stable and asymptomatic.     Plan:  Stable cardiac status.   Wean off metoprolol by first decreasing to 12.5 mg daily for 10 days then discontinue.  Continue current medications.  Revisit in 6 MO, or sooner as needed.        Scribed for Shaheed Mcallister MD by Delmy Gonzalez. 11/27/2023 09:17 EST  Shaheed Mcallister MD    Please note that portions of this note may have been completed with a voice recognition program. Efforts were made to edit the dictations, but occasionally words are mistranscribed.

## 2024-06-05 ENCOUNTER — OFFICE VISIT (OUTPATIENT)
Dept: ENDOCRINOLOGY | Facility: CLINIC | Age: 74
End: 2024-06-05
Payer: MEDICARE

## 2024-06-05 VITALS
WEIGHT: 173 LBS | HEART RATE: 60 BPM | SYSTOLIC BLOOD PRESSURE: 126 MMHG | DIASTOLIC BLOOD PRESSURE: 80 MMHG | HEIGHT: 68 IN | BODY MASS INDEX: 26.22 KG/M2 | OXYGEN SATURATION: 100 %

## 2024-06-05 DIAGNOSIS — E03.9 ACQUIRED HYPOTHYROIDISM: Primary | ICD-10-CM

## 2024-06-05 LAB
T4 FREE SERPL-MCNC: 1.6 NG/DL (ref 0.92–1.68)
TSH SERPL DL<=0.05 MIU/L-ACNC: 0.28 UIU/ML (ref 0.27–4.2)

## 2024-06-05 PROCEDURE — 84439 ASSAY OF FREE THYROXINE: CPT | Performed by: INTERNAL MEDICINE

## 2024-06-05 PROCEDURE — 86376 MICROSOMAL ANTIBODY EACH: CPT | Performed by: INTERNAL MEDICINE

## 2024-06-05 PROCEDURE — 99204 OFFICE O/P NEW MOD 45 MIN: CPT | Performed by: INTERNAL MEDICINE

## 2024-06-05 PROCEDURE — 36415 COLL VENOUS BLD VENIPUNCTURE: CPT | Performed by: INTERNAL MEDICINE

## 2024-06-05 PROCEDURE — 84443 ASSAY THYROID STIM HORMONE: CPT | Performed by: INTERNAL MEDICINE

## 2024-06-05 RX ORDER — LEVOTHYROXINE SODIUM 112 UG/1
112 TABLET ORAL DAILY
COMMUNITY
End: 2024-06-06 | Stop reason: DRUGHIGH

## 2024-06-06 DIAGNOSIS — E03.9 HYPOTHYROIDISM, UNSPECIFIED TYPE: Primary | ICD-10-CM

## 2024-06-06 LAB — THYROPEROXIDASE AB SERPL-ACNC: 9 IU/ML (ref 0–34)

## 2024-06-06 RX ORDER — LEVOTHYROXINE SODIUM 0.1 MG/1
100 TABLET ORAL EVERY MORNING
Qty: 90 TABLET | Refills: 3 | Status: SHIPPED | OUTPATIENT
Start: 2024-06-06

## 2024-07-01 NOTE — PROGRESS NOTES
Chief Complaint   Patient presents with    Hypothyroidism     Referred by imtiaz Baker        HPI:   Reba Kong is a 73 y.o.female sent in consultation by Dr. Imtiaz Baker for further evaluation and management of patient's hypothyroidism. Her history is as follows:    1) acquired hypothyroidism:  -Patient was diagnosed with hypothyroidism in her 40s  -She recalls taking levothyroxine 137 mcg for several years  -In the fall 2023, she was seen by another provider in her PCPs office for a URI. A TSH collected at that time was slightly elevated and that provider increased her levothyroxine from 137 mcg to 150 mcg.  -In September 2023, the patient presented to [facility] ED and was found to be in atrial fibrillation.  Her TSH at that time 0.017.   - Her levothyroxine dose has been slowly decreased by her PCP since that time    Current dose: Levothyroxine 112 mcg  -Takes in a.m. on an empty stomach.    - Does not take with any interfering factors  -Denies missed doses    Other history: Status post glucocorticoid injection in her knee 2 weeks ago.  On May 9, 2024 was treated with a Medrol dose pack    Review of Systems   Constitutional:  Positive for diaphoresis. Negative for fatigue.        Weight stable   HENT: Negative.     Eyes: Negative.    Respiratory: Negative.     Cardiovascular:  Positive for palpitations.   Gastrointestinal: Negative.    Endocrine: Negative.    Genitourinary: Negative.    Musculoskeletal:  Positive for arthralgias.   Skin: Negative.    Allergic/Immunologic: Negative.    Neurological: Negative.    Hematological: Negative.    Psychiatric/Behavioral: Negative.       Past Medical History:   Diagnosis Date    Hiatal hernia with GERD     Hyperlipidemia     Hypothyroidism     PUD (peptic ulcer disease) 2015     family history includes Heart failure in her brother; Leukemia in her father; Lung cancer in her mother.  Past Surgical History:   Procedure Laterality Date     "COLONOSCOPY W/ POLYPECTOMY      ENDOSCOPY  2015    HYSTERECTOMY  2003    OOPHORECTOMY       Social History     Tobacco Use    Smoking status: Never     Passive exposure: Past    Smokeless tobacco: Never   Vaping Use    Vaping status: Never Used   Substance Use Topics    Alcohol use: Yes     Alcohol/week: 4.0 - 7.0 standard drinks of alcohol     Types: 4 - 7 Glasses of wine per week     Comment: social    Drug use: Never     Outpatient Medications Prior to Visit   Medication Sig Dispense Refill    apixaban (ELIQUIS) 5 MG tablet tablet Take 1 tablet by mouth 2 (Two) Times a Day. 60 tablet 5    hydrOXYzine (ATARAX) 10 MG tablet Take  by mouth As Needed.      sertraline (ZOLOFT) 25 MG tablet Take 1 tablet by mouth Daily.      triamterene-hydrochlorothiazide (DYAZIDE) 37.5-25 MG per capsule Take 1 capsule by mouth Every Morning.      levothyroxine (SYNTHROID, LEVOTHROID) 112 MCG tablet Take 1 tablet by mouth Daily.      levothyroxine (SYNTHROID, LEVOTHROID) 150 MCG tablet Take 112 mcg by mouth As Needed. No longer on 150 mcg      acetaminophen (TYLENOL) 650 MG 8 hr tablet Take  by mouth As Needed.      benzonatate (TESSALON) 200 MG capsule Take 1 capsule by mouth 3 (Three) Times a Day As Needed for Cough. 30 capsule 0     No facility-administered medications prior to visit.     Allergies   Allergen Reactions    Penicillins Rash       /80   Pulse 60   Ht 172.7 cm (67.99\")   Wt 78.5 kg (173 lb)   SpO2 100%   BMI 26.31 kg/m²   Physical Exam  Vitals reviewed.   Constitutional:       General: She is not in acute distress.     Appearance: She is well-developed. She is not diaphoretic.   HENT:      Head: Normocephalic.   Eyes:      Conjunctiva/sclera: Conjunctivae normal.      Pupils: Pupils are equal, round, and reactive to light.   Neck:      Thyroid: No thyromegaly.      Trachea: No tracheal deviation.      Comments: No palpable thyroid nodules    Cardiovascular:      Rate and Rhythm: Normal rate and regular " rhythm.      Heart sounds: Normal heart sounds. No murmur heard.  Pulmonary:      Effort: Pulmonary effort is normal. No respiratory distress.      Breath sounds: Normal breath sounds.   Lymphadenopathy:      Cervical: No cervical adenopathy.   Skin:     General: Skin is warm and dry.      Findings: No erythema.   Neurological:      Mental Status: She is alert and oriented to person, place, and time.      Cranial Nerves: No cranial nerve deficit.   Psychiatric:         Behavior: Behavior normal.         LABS/IMAGING: outside records reviewed and summarized in HPI  Results for orders placed or performed in visit on 06/05/24   TSH    Specimen: Blood   Result Value Ref Range    TSH 0.276 0.270 - 4.200 uIU/mL   Thyroid Peroxidase Antibody    Specimen: Arm, Left; Blood   Result Value Ref Range    Thyroid Peroxidase Antibody 9 0 - 34 IU/mL   T4, Free    Specimen: Blood   Result Value Ref Range    Free T4 1.60 0.92 - 1.68 ng/dL       ASSESSMENT/PLAN:  1) hypothyroidism, acquired:  - Pt was clinically euthyroid on exam.  Discussed with patient that it is not clear why she is requiring less thyroid hormone in recent months after several years of taking the same dose.  Discussed the possibility of a thyroiditis; however, this is difficult to prove as she had none of the clinical symptoms expected during thyroiditis.  -Although her TSH was in the normal range, it was at the lower end of the normal range.  Given her current palpitations, I recommended we lower the dose to 100 mcg daily.  -Will have patient repeat her thyroid function test in 2 months.  -I briefly looked at her gland with ultrasound in clinic which showed a small, atrophied gland without nodules  - Reviewed proper thyroid hormone administration, and factors to avoid that decrease medication potency and medication absorption.     RTC 6 months    Electronically Signed: Lenore Loya MD

## 2024-07-10 RX ORDER — APIXABAN 5 MG/1
5 TABLET, FILM COATED ORAL 2 TIMES DAILY
Qty: 60 TABLET | Refills: 0 | Status: SHIPPED | OUTPATIENT
Start: 2024-07-10

## 2024-07-29 ENCOUNTER — OFFICE VISIT (OUTPATIENT)
Dept: CARDIOLOGY | Facility: CLINIC | Age: 74
End: 2024-07-29
Payer: MEDICARE

## 2024-07-29 VITALS
WEIGHT: 173.4 LBS | BODY MASS INDEX: 26.28 KG/M2 | HEART RATE: 53 BPM | SYSTOLIC BLOOD PRESSURE: 108 MMHG | HEIGHT: 68 IN | DIASTOLIC BLOOD PRESSURE: 74 MMHG | OXYGEN SATURATION: 98 %

## 2024-07-29 DIAGNOSIS — I48.0 PAROXYSMAL ATRIAL FIBRILLATION: Primary | ICD-10-CM

## 2024-07-29 DIAGNOSIS — R00.1 BRADYCARDIA, SINUS: ICD-10-CM

## 2024-07-29 PROCEDURE — 1160F RVW MEDS BY RX/DR IN RCRD: CPT | Performed by: INTERNAL MEDICINE

## 2024-07-29 PROCEDURE — 1159F MED LIST DOCD IN RCRD: CPT | Performed by: INTERNAL MEDICINE

## 2024-07-29 PROCEDURE — 99213 OFFICE O/P EST LOW 20 MIN: CPT | Performed by: INTERNAL MEDICINE

## 2024-07-29 NOTE — PROGRESS NOTES
[facility] Cardiology  Subjective:     Encounter Date: 07/29/2024      Patient ID: Reba Kong is a 73 y.o. female.    Chief Complaint: Atrial Fibrillation      PROBLEM LIST:  Paroxysmal atrial fibrillation  Diagnosed 9/2023 s/p ECV to SR.  CHADSVASC- 2, started on Eliquis  Echo, 11/20/2023: EF 60%. Normal.  HH/GERD  Hypothyroidsim  Post COVID lung issues  Surgeries:  Hysterectomy , total  Colon polypectomy       History of Present Illness  Reba Kong returns today for a follow up with a history of PAF and cardiac risk factors. Since last visit, patient has been doing well overall from a cardiovascular standpoint. She reports her heart rhythm has been felt normal since her synthroid was decreased but feels occasional palpitations. Patient notes she has a new endocrinologist following her thyroid function. She admits she does not have healthy eating habits which is reflected in her LDL reading of 134. Patient denies chest pain, shortness of breath, orthopnea, edema, dizziness, and syncope.       Allergies   Allergen Reactions    Penicillins Rash         Current Outpatient Medications:     Eliquis 5 MG tablet tablet, Take 1 tablet by mouth twice daily, Disp: 60 tablet, Rfl: 0    hydrOXYzine (ATARAX) 10 MG tablet, Take  by mouth As Needed., Disp: , Rfl:     levothyroxine (SYNTHROID, LEVOTHROID) 100 MCG tablet, Take 1 tablet by mouth Every Morning., Disp: 90 tablet, Rfl: 3    sertraline (ZOLOFT) 25 MG tablet, Take 1 tablet by mouth Daily., Disp: , Rfl:     triamterene-hydrochlorothiazide (DYAZIDE) 37.5-25 MG per capsule, Take 1 capsule by mouth Every Morning., Disp: , Rfl:     The following portions of the patient's history were reviewed and updated as appropriate: allergies, current medications, past family history, past medical history, past social history, past surgical history and problem list.    ROS       Objective:     Vitals:    07/29/24 0847   BP: 108/74   BP Location:  "Right arm   Patient Position: Sitting   Pulse: 53   SpO2: 98%   Weight: 78.7 kg (173 lb 6.4 oz)   Height: 172.7 cm (68\")         Vitals reviewed.   Constitutional:       Appearance: Well-developed and not in distress.   Neck:      Thyroid: No thyromegaly.      Vascular: No carotid bruit or JVD.   Pulmonary:      Breath sounds: Normal breath sounds.   Cardiovascular:      Regular rhythm.      No gallop. No S3 and S4 gallop.   Pulses:     Intact distal pulses.      Carotid: 2+ bilaterally.     Radial: 2+ bilaterally.  Edema:     Peripheral edema absent.   Abdominal:      General: Bowel sounds are normal.      Palpations: Abdomen is soft. There is no abdominal mass.      Tenderness: There is no abdominal tenderness.   Musculoskeletal:         General: No deformity.      Extremities: No clubbing present.Skin:     General: Skin is warm and dry.      Findings: No rash.   Neurological:      Mental Status: Alert and oriented to person, place, and time.         Lab Review:  Lab date: 11/10/2023  FLP: , TG 90, HDL 60,   CMP: Glu 90, BUN 21, Creat 0.91, eGFR >60, Na 143, K 4.8, Cl 108, CO2 26, Ca 9.8, Alk Phos 93, AST 17, ALT 13  CBC: WBC 4.5, RBC 4.22, HGB 12.9, HCT 38.4, MCV 91, MCH 30.6,     Lab Results   Component Value Date    TSH 0.276 06/05/2024     Procedures      Advance Care Planning   ACP discussion was held with the patient during this visit. Patient does not have an advance directive, information provided.           Assessment:   Diagnoses and all orders for this visit:    1. Paroxysmal atrial fibrillation (Primary)    2. Bradycardia, sinus        Impression:  1. Paroxysmal atrial fibrillation. Stable and asymptomatic. Continue on Eliquis 5 mg BID for stroke prophylaxis.     2. Bradycardia, sinus. Stable and asymptomatic.     3.  Hypothyroidism/over replacement.  Being titrated down her Synthroid dose currently.    Plan:  Stable cardiac status.   Continue current medications.  Once she is " euthyroid, we will place a 30-day monitor to see if she still needs her Eliquis.  Only documented highly symptomatic event in the setting of hyperthyroidism  Revisit in 12 MO, or sooner as needed.        Scribed for Shaheed Mcallister MD by Delmy Gonzalez. 7/29/2024 09:12 EDT    Shaheed Mcallister MD    Please note that portions of this note may have been completed with a voice recognition program. Efforts were made to edit the dictations, but occasionally words are mistranscribed.

## 2024-08-02 ENCOUNTER — LAB (OUTPATIENT)
Facility: HOSPITAL | Age: 74
End: 2024-08-02
Payer: MEDICARE

## 2024-08-02 DIAGNOSIS — E03.9 HYPOTHYROIDISM, UNSPECIFIED TYPE: ICD-10-CM

## 2024-08-02 LAB
T4 FREE SERPL-MCNC: 1.48 NG/DL (ref 0.92–1.68)
TSH SERPL DL<=0.05 MIU/L-ACNC: 0.38 UIU/ML (ref 0.27–4.2)

## 2024-08-02 PROCEDURE — 84439 ASSAY OF FREE THYROXINE: CPT

## 2024-08-02 PROCEDURE — 84443 ASSAY THYROID STIM HORMONE: CPT

## 2024-08-12 ENCOUNTER — OFFICE VISIT (OUTPATIENT)
Dept: ORTHOPEDIC SURGERY | Facility: CLINIC | Age: 74
End: 2024-08-12
Payer: MEDICARE

## 2024-08-12 VITALS
SYSTOLIC BLOOD PRESSURE: 118 MMHG | DIASTOLIC BLOOD PRESSURE: 66 MMHG | WEIGHT: 171.6 LBS | BODY MASS INDEX: 26.01 KG/M2 | HEIGHT: 68 IN

## 2024-08-12 DIAGNOSIS — M17.12 PRIMARY OSTEOARTHRITIS OF LEFT KNEE: ICD-10-CM

## 2024-08-12 DIAGNOSIS — M25.562 LEFT KNEE PAIN, UNSPECIFIED CHRONICITY: Primary | ICD-10-CM

## 2024-08-12 NOTE — PROGRESS NOTES
[facility] Note    Subjective     Chief Complaint   Patient presents with    Left Knee - Pain        HPI  Reba Kong is a 73 y.o. female.  New patient presents for evaluation of left knee pain.  Symptoms/pain have been ongoing for about 4 months.  GLORIA: No history of injury or trauma.  Patient was previously seen at .  She underwent aspiration/corticosteroid injection 5/24/2024--procedure helped near 100% for about 2 weeks only.  All pain has returned.  She then underwent a Pez bursa injection 7/11/2024 at  which made no difference in her pain symptoms.      Pain scale: 8/10.  Severity of the pain moderate to severe.  Quality of the pain aching, throbbing, stabbing.  Associated symptoms pain, swelling, popping, grinding, stiffness, giving away.  Activity related to pain walking, standing, stairs, sleeping, lying on the affected side, rising from a seated position, movement of joint.  Pain eased by resting but only minimal benefit.  No reported numbness or tingling.  Prior treatments corticosteroid injection, bracing, Tylenol.    Denies fever, chills, night sweats or other constitutional symptoms.    Past Medical History:   Diagnosis Date    Abnormal ECG 6/23    Asthma 2020 Covid    Atrial fibrillation 9/23    Hiatal hernia with GERD     Hyperlipidemia     Hypothyroidism     PUD (peptic ulcer disease) 2015      Past Surgical History:   Procedure Laterality Date    COLONOSCOPY W/ POLYPECTOMY      ENDOSCOPY  2015    HYSTERECTOMY  2003    OOPHORECTOMY        Family History   Problem Relation Age of Onset    Lung cancer Mother     Leukemia Father     Heart failure Brother     Heart failure Maternal Grandfather     Breast cancer Neg Hx     Ovarian cancer Neg Hx     Endometrial cancer Neg Hx      Social History     Socioeconomic History    Marital status:     Number of children: 1   Tobacco Use    Smoking status: Never     Passive exposure: Past    Smokeless tobacco: Never   Vaping  "Use    Vaping status: Never Used   Substance and Sexual Activity    Alcohol use: Yes     Alcohol/week: 2.0 standard drinks of alcohol     Types: 2 Drinks containing 0.5 oz of alcohol per week     Comment: Social    Drug use: Never    Sexual activity: Not Currently     Partners: Male     Birth control/protection: None     Comment: NA      Current Outpatient Medications on File Prior to Visit   Medication Sig Dispense Refill    apixaban (Eliquis) 5 MG tablet tablet Take 1 tablet by mouth 2 (Two) Times a Day. 60 tablet 1    hydrOXYzine (ATARAX) 10 MG tablet Take  by mouth As Needed.      levothyroxine (SYNTHROID, LEVOTHROID) 100 MCG tablet Take 1 tablet by mouth Every Morning. 90 tablet 3    sertraline (ZOLOFT) 25 MG tablet Take 1 tablet by mouth Daily.      triamterene-hydrochlorothiazide (DYAZIDE) 37.5-25 MG per capsule Take 1 capsule by mouth Every Morning.       No current facility-administered medications on file prior to visit.      Allergies   Allergen Reactions    Penicillins Rash        The following portions of the patient's history were reviewed and updated as appropriate: allergies, current medications, past family history, past medical history, past social history, past surgical history, and problem list.    Review of Systems   Constitutional: Negative.    HENT: Negative.     Eyes: Negative.    Respiratory: Negative.     Cardiovascular: Negative.    Gastrointestinal: Negative.    Endocrine: Negative.    Genitourinary: Negative.    Musculoskeletal:  Positive for arthralgias.   Skin: Negative.    Allergic/Immunologic: Negative.    Neurological: Negative.    Hematological: Negative.    Psychiatric/Behavioral: Negative.          Objective      Physical Exam  /66   Ht 172.7 cm (67.99\")   Wt 77.8 kg (171 lb 9.6 oz)   BMI 26.10 kg/m²     Body mass index is 26.1 kg/m².    GENERAL APPEARANCE: awake, alert & oriented x 3, in no acute distress and well developed, well nourished  PSYCH: normal mood and " affect  LUNGS:  breathing nonlabored, no wheezing  EYES: sclera anicteric, pupils equal  CARDIOVASCULAR: palpable pulses. Capillary refill less than 2 seconds  INTEGUMENTARY: skin intact, no clubbing, cyanosis  NEUROLOGIC:  Normal Sensation         Ortho Exam  Left knee  Alignment: Genu varum  Skin: Intact without any erythema, warmth.  Trace effusion.  Motion: 0-120° with crepitus.  Tenderness: Diffuse tenderness noted throughout the knee but more pronounced posterior, anterior, medial knee.    Instability: Lachman negative.  Varus and valgus stress test negative.   Patella: Compression/grind positive.  Straight leg raise: Intact.  Motor: Grossly intact Q/HS/TA/GS/EHL/P  Sensory: Grossly intact DP/SP/S/S/T nerve distributions.       Imaging/Studies  Ordered left knee plain films.  Imaging read/interpreted by Dr. Coy.    Imaging Results (Last 7 Days)       Procedure Component Value Units Date/Time    XR Knee 4+ View Left [[id number]] Resulted: 08/12/24 1149     Updated: 08/12/24 1149    Narrative:      Knee X-Ray  Indication: Pain    Upright AP of bilateral knees. Lateral, skiers and Sunrise views of left   knee     Findings:  No fracture  Bone-on-bone medial compartment left knee.  Kellgren-Chevy grade 4    No prior studies were available for comparison.          Went back and reviewed three-view plain films from 4/25/2024 and MRI from 5/1/2024--all are performed at .  Narrative    CLINICAL INDICATION:  pain    TECHNIQUE:  XR KNEE LEFT 3 VIEWS    COMPARISON:  None.    FINDINGS:  3 views of the left knee show patellofemoral compartment joint space narrowing and osteophyte formation. No fracture or osteonecrosis. No effusion. Soft tissues are normal.    Impression    Moderate patellofemoral osteoarthritis.    CRITICAL RESULT:    No.    COMMUNICATION:  Per this written report.    Drafted by Fermin Cruz MD on 4/25/2024 8:54 AM  Final report signed by Fermin Cruz MD on 4/25/2024 8:55  AM      MRI  CLINICAL INDICATION:   Knee trauma, internal derangement suspected, xray done     TECHNIQUE:   Axial T2 FS, sagittal proton density FS, sagittal SIDNEY, sagittal proton density, coronal T1, coronal proton density FS, coronal oblique proton density.     COMPARISON:   Radiograph April 6, 2024     FINDINGS:   Menisci: There is a complete radial tear of the posterior horn of the medial meniscus, with partial extrusion of the meniscal body. There is a horizontal tear in the body and anterior posterior horns of the lateral meniscus. Diffuse degenerative changes of the medial and lateral menisci.     Ligaments: Degenerative changes in the ACL. PCL and LCL are intact. Edema like signal surrounding the MCL, likely reactive to the meniscal injury.     Tendons/Muscles: The pes anserine tendons are intact without associated bursitis or soft tissue inflammation. The iliotibial band is intact without associated bursitis. There is no evidence of muscle strain.     Patellofemoral Space and Extensor Mechanism: The patella is normally aligned in the trochlear groove. Edema like signal within the suprapatellar fat. T2 intermediate signal in the quadriceps tendon.     Bones and Cartilage: Complete cartilage loss in the patella and trochlea, with edema like signal in the patella. Joint space loss with osteophyte formation and the patella. Greater than 50% cartilage loss in the weightbearing surfaces of the medial and lateral compartments. There are small tricompartmental osteophyte formations.     Soft Tissues: Mild prepatellar soft tissue swelling. Large Baker's cyst, which has ruptured, with edema like signal in the posteromedial corner. T2 and T1 hypointense material within the large Baker cyst. Small joint effusion.     Impression      Medial and lateral meniscal tear.    Degenerative changes in the ACL.    Osteoarthritis of the knee as above described.    Large Baker's cyst, which has ruptured, probably with remote  internal hemorrhage.    CRITICAL RESULT:    No.    COMMUNICATION:  Per this written report.      By electronically signing this report, I, the attending physician, attest that I have personally reviewed the images/data for the above examination(s) and agree with the final edited report.    Drafted by Burton Ny DO on 5/1/2024 10:54 AM  Final report signed by Leonard Hamilton MD on 5/1/2024 11:47 AM  Assessment/Plan        ICD-10-CM ICD-9-CM   1. Left knee pain, unspecified chronicity  M25.562 719.46   2. Primary osteoarthritis of left knee  M17.12 715.16       Orders Placed This Encounter   Procedures    - Large Joint Arthrocentesis: L knee    Large Joint Arthrocentesis    XR Knee 4+ View Left        -Left knee pain (increasing) due to osteoarthritis--patient reports 2 weeks of near 100% pain relief with corticosteroid injection (given at ) but pain has returned.    -Reviewed imaging with the patient.  -Treatment options were discussed with patient to include bracing, PT, OTC pain medication, injections, surgical intervention (TKA).    -Patient would like to exhaust all conservative measures before proceeding with any type of surgical intervention.  -Will proceed with viscosupplementation series.  Her insurance will allow us to start today.  First Orthovisc injection was given today.  -Recommend OTC pain medication as needed.  -Follow up next week for 2/3 Orthovisc.  -Questions and concerns answered.    After discussing risks of injection the patient gave consent to proceed.  Her left kn was confirmed as the correct joint to be injected with a timeout.  The knee was then prepped with Hibiclens and injected with a prefilled syringe of Orthovisc without any resistance using anterior lateral approach, patient in seated position.  The patient tolerated procedure well.  Hemostasis was achieved and a Band-Aid was applied over the injection site.  I instructed the patient on signs and symptoms of  infection.  They should report to the ED if any of these develop.  Recommended modifying activity to include rest, ice, elevation and/or heat along with oral pain medication as needed.        Medical Decision Making  Management Options : over-the-counter medicine and prescription/IM medicine  Data/Risk: radiology tests      Keely Johnson PA-C  08/13/24  10:53 EDT               EMR Dragon/Transcription disclaimer:  Much of this encounter note is an electronic transcription of spoken language to printed text. Electronic transcription of spoken language may permit erroneous, or at times, nonsensical words or phrases to be inadvertently transcribed. Although I have reviewed the note for such errors, some may still exist.

## 2024-08-12 NOTE — PROGRESS NOTES
Procedure   - Large Joint Arthrocentesis: L knee on 8/12/2024 9:29 AM  Indications: pain  Details: 21 G needle, anterolateral approach  Medications: 30 mg Hyaluronan 30 MG/2ML  Outcome: tolerated well, no immediate complications  Procedure, treatment alternatives, risks and benefits explained, specific risks discussed. Consent was given by the patient. Immediately prior to procedure a time out was called to verify the correct patient, procedure, equipment, support staff and site/side marked as required. Patient was prepped and draped in the usual sterile fashion.

## 2024-08-19 ENCOUNTER — CLINICAL SUPPORT (OUTPATIENT)
Dept: ORTHOPEDIC SURGERY | Facility: CLINIC | Age: 74
End: 2024-08-19
Payer: MEDICARE

## 2024-08-19 DIAGNOSIS — M17.12 PRIMARY OSTEOARTHRITIS OF LEFT KNEE: Primary | ICD-10-CM

## 2024-08-19 PROCEDURE — 20610 DRAIN/INJ JOINT/BURSA W/O US: CPT | Performed by: PHYSICIAN ASSISTANT

## 2024-08-19 NOTE — PROGRESS NOTES
Procedure   - Large Joint Arthrocentesis: L knee on 8/19/2024 8:36 AM  Indications: pain  Details: 21 G needle, anterolateral approach  Medications: 30 mg Hyaluronan 30 MG/2ML  Outcome: tolerated well, no immediate complications  Procedure, treatment alternatives, risks and benefits explained, specific risks discussed. Consent was given by the patient. Immediately prior to procedure a time out was called to verify the correct patient, procedure, equipment, support staff and site/side marked as required. Patient was prepped and draped in the usual sterile fashion.

## 2024-08-19 NOTE — PROGRESS NOTES
CC: Follow-up left knee osteoarthritis, 2/3 Orthovisc injection today    History of present illness: Patient presents for her second Orthovisc injection to the left knee today.  At this time the patient denies any numbness or tingling into the distal extremity.  No fever, chills, night sweats or other constitutional symptoms.    No significant change since starting injections last week.    See chart for PMH, PSH, Meds, All - reviewed.    Ortho exam:  Left knee  Skin: Intact without redness, warmth. Trace effusion.    Motor/sensory: Grossly intact L2-S1.    Assessment/plan:  Left knee osteoarthritis    Proceed today with 2/3 of Orthovisc injection.  Patient will follow-up in 1 week for third injection.      After discussing risks of injection the patient gave consent to proceed.  Her left knee was confirmed as the correct joint to be injected with a timeout.  The knee was then prepped with Hibiclens and injected with a prefilled syringe of Orthovisc without any resistance using anterior lateral approach, patient in seated position.  The patient tolerated procedure well.  Hemostasis was achieved and a Band-Aid was applied over the injection site.  I instructed the patient on signs and symptoms of infection.  They should report to the ED if any of these develop.  Recommended modifying activity to include rest, ice, elevation and/or heat along with oral pain medication as needed.  Patient observed ambulating normally after the injection.

## 2024-08-26 ENCOUNTER — CLINICAL SUPPORT (OUTPATIENT)
Dept: ORTHOPEDIC SURGERY | Facility: CLINIC | Age: 74
End: 2024-08-26
Payer: MEDICARE

## 2024-08-26 DIAGNOSIS — M17.12 PRIMARY OSTEOARTHRITIS OF LEFT KNEE: Primary | ICD-10-CM

## 2024-08-26 PROCEDURE — 20610 DRAIN/INJ JOINT/BURSA W/O US: CPT | Performed by: PHYSICIAN ASSISTANT

## 2024-08-26 NOTE — PROGRESS NOTES
Procedure   - Large Joint Arthrocentesis: L knee on 8/26/2024 8:42 AM  Indications: pain  Details: 21 G needle, anterolateral approach  Medications: 30 mg Hyaluronan 30 MG/2ML  Outcome: tolerated well, no immediate complications  Procedure, treatment alternatives, risks and benefits explained, specific risks discussed. Consent was given by the patient. Immediately prior to procedure a time out was called to verify the correct patient, procedure, equipment, support staff and site/side marked as required. Patient was prepped and draped in the usual sterile fashion.

## 2024-08-26 NOTE — PROGRESS NOTES
CC: Follow-up left knee osteoarthritis, 3/3 Orthovisc injection today     History of present illness: Patient presents for her third Orthovisc injection to the left knee today.  At this time the patient denies any numbness or tingling into the distal extremity.  No fever, chills, night sweats or other constitutional symptoms.     She feels there is some improvement.     See chart for PMH, PSH, Meds, All - reviewed.     Ortho exam:  Left knee  Skin: Intact without redness, warmth. Trace effusion.    Motor/sensory: Grossly intact L2-S1.     Assessment/plan:  Left knee osteoarthritis     Proceed today with 3/3 of Orthovisc injection.  Patient will follow-up in 6 weeks.       After discussing risks of injection the patient gave consent to proceed.  Her left knee was confirmed as the correct joint to be injected with a timeout.  The knee was then prepped with Hibiclens and injected with a prefilled syringe of Orthovisc without any resistance using anterior lateral approach, patient in seated position.  The patient tolerated procedure well.  Hemostasis was achieved and a Band-Aid was applied over the injection site.  I instructed the patient on signs and symptoms of infection.  They should report to the ED if any of these develop.  Recommended modifying activity to include rest, ice, elevation and/or heat along with oral pain medication as needed.  Patient observed ambulating normally after the injection.

## 2024-10-03 RX ORDER — APIXABAN 5 MG/1
5 TABLET, FILM COATED ORAL 2 TIMES DAILY
Qty: 60 TABLET | Refills: 0 | Status: SHIPPED | OUTPATIENT
Start: 2024-10-03

## 2024-10-07 ENCOUNTER — OFFICE VISIT (OUTPATIENT)
Dept: ORTHOPEDIC SURGERY | Facility: CLINIC | Age: 74
End: 2024-10-07
Payer: MEDICARE

## 2024-10-07 VITALS
DIASTOLIC BLOOD PRESSURE: 82 MMHG | HEIGHT: 68 IN | WEIGHT: 177 LBS | BODY MASS INDEX: 26.83 KG/M2 | SYSTOLIC BLOOD PRESSURE: 124 MMHG

## 2024-10-07 DIAGNOSIS — M17.12 PRIMARY OSTEOARTHRITIS OF LEFT KNEE: Primary | ICD-10-CM

## 2024-10-07 PROCEDURE — 1160F RVW MEDS BY RX/DR IN RCRD: CPT | Performed by: PHYSICIAN ASSISTANT

## 2024-10-07 PROCEDURE — 99213 OFFICE O/P EST LOW 20 MIN: CPT | Performed by: PHYSICIAN ASSISTANT

## 2024-10-07 PROCEDURE — 1159F MED LIST DOCD IN RCRD: CPT | Performed by: PHYSICIAN ASSISTANT

## 2024-10-07 NOTE — PROGRESS NOTES
"    [facility] Note        Subjective     CC: Follow-up (6 week recheck- Primary osteoarthritis of left knee)      HPI    Reba Kong is a 73 y.o. female.  Patient returns today for follow-up of her left knee.  She completed viscosupplementation series 8/26/2024.  She states at this time she has had good relief with the series and is doing quite well.  She states the pain is tolerable with the pain scale of 1-2/10.  Because she is doing well she is still uninterested in any surgical intervention (TKA).    Overall, patient's symptoms are improved/better.    ROS:    Constiutional:Pt denies fever, chills, nausea, or vomiting.  MSK:as above        Objective      Past Medical History  Past Medical History:   Diagnosis Date    Abnormal ECG 6/23    Asthma 2020 Covid    Atrial fibrillation 9/23    Hiatal hernia with GERD     Hyperlipidemia     Hypothyroidism     Knee swelling 4/24    PUD (peptic ulcer disease) 2015    Tear of meniscus of knee 4/24     Social History     Socioeconomic History    Marital status:     Number of children: 1   Tobacco Use    Smoking status: Never     Passive exposure: Past    Smokeless tobacco: Never   Vaping Use    Vaping status: Never Used   Substance and Sexual Activity    Alcohol use: Yes     Alcohol/week: 2.0 standard drinks of alcohol     Types: 2 Drinks containing 0.5 oz of alcohol per week     Comment: Social    Drug use: Never    Sexual activity: Not Currently     Partners: Male     Birth control/protection: None     Comment: NA          Physical Exam  /82   Ht 172.7 cm (67.99\")   Wt 80.3 kg (177 lb)   BMI 26.92 kg/m²     Body mass index is 26.92 kg/m².    Patient is well nourished and well developed.        Ortho Exam  Left knee  Skin: Intact without any erythema, warmth, swelling.  Motion: 0-120° with crepitus.  Tenderness: No significant pain on exam  Straight leg raise: Intact.  Motor/sensory: Grossly intact L2-S1.      Imaging/Labs/EMG " Reviewed and Interpreted:  No new imaging today.      Assessment:  1. Primary osteoarthritis of left knee        Plan:  Left knee osteoarthritis--patient is doing well following completion of viscosupplementation series.  She is uninterested in any surgical intervention at this time.  Will continue with OTC pain meds as needed.  If she wishes to proceed with repeat viscosupplementation series that can be started on or after 2/27/2025.  Patient will need to contact the clinic approximately 1 month before that date to obtain preauthorization for viscosupplementation series.  If her pain starts to increase prior to that then we did discuss possible corticosteroid injection.  Patient would like to follow-up as needed at this time.  Questions and concerns answered.      Keely Johnson PA-C  10/08/24  21:28 EDT      Dictated Utilizing Dragon Dictation.

## 2024-10-31 RX ORDER — APIXABAN 5 MG/1
5 TABLET, FILM COATED ORAL 2 TIMES DAILY
Qty: 180 TABLET | Refills: 1 | Status: SHIPPED | OUTPATIENT
Start: 2024-10-31

## 2024-11-03 PROBLEM — J02.9 SORE THROAT: Status: ACTIVE | Noted: 2024-11-03

## 2024-11-04 ENCOUNTER — PATIENT ROUNDING (BHMG ONLY) (OUTPATIENT)
Dept: URGENT CARE | Facility: CLINIC | Age: 74
End: 2024-11-04
Payer: MEDICARE

## 2024-11-15 DIAGNOSIS — E03.9 ACQUIRED HYPOTHYROIDISM: Primary | ICD-10-CM

## 2024-11-19 ENCOUNTER — TRANSCRIBE ORDERS (OUTPATIENT)
Dept: GENERAL RADIOLOGY | Facility: HOSPITAL | Age: 74
End: 2024-11-19
Payer: MEDICARE

## 2024-11-19 ENCOUNTER — HOSPITAL ENCOUNTER (OUTPATIENT)
Dept: GENERAL RADIOLOGY | Facility: HOSPITAL | Age: 74
Discharge: HOME | End: 2024-11-19
Admitting: FAMILY MEDICINE
Payer: MEDICARE

## 2024-11-19 DIAGNOSIS — J84.10 PULMONARY GRANULOMA: Primary | ICD-10-CM

## 2024-11-19 DIAGNOSIS — J84.10 PULMONARY GRANULOMA: ICD-10-CM

## 2024-11-19 PROCEDURE — 71046 X-RAY EXAM CHEST 2 VIEWS: CPT

## 2024-11-27 ENCOUNTER — LAB (OUTPATIENT)
Facility: HOSPITAL | Age: 74
End: 2024-11-27
Payer: MEDICARE

## 2024-11-27 DIAGNOSIS — E03.9 ACQUIRED HYPOTHYROIDISM: ICD-10-CM

## 2024-11-27 LAB
T4 FREE SERPL-MCNC: 1.32 NG/DL (ref 0.92–1.68)
TSH SERPL DL<=0.05 MIU/L-ACNC: 1.22 UIU/ML (ref 0.27–4.2)

## 2024-11-27 PROCEDURE — 84443 ASSAY THYROID STIM HORMONE: CPT

## 2024-11-27 PROCEDURE — 84439 ASSAY OF FREE THYROXINE: CPT

## 2024-12-03 ENCOUNTER — OFFICE VISIT (OUTPATIENT)
Dept: ENDOCRINOLOGY | Facility: CLINIC | Age: 74
End: 2024-12-03
Payer: MEDICARE

## 2024-12-03 VITALS
BODY MASS INDEX: 26.98 KG/M2 | HEART RATE: 60 BPM | DIASTOLIC BLOOD PRESSURE: 70 MMHG | RESPIRATION RATE: 16 BRPM | WEIGHT: 178 LBS | OXYGEN SATURATION: 96 % | HEIGHT: 68 IN | SYSTOLIC BLOOD PRESSURE: 134 MMHG

## 2024-12-03 DIAGNOSIS — E03.9 ACQUIRED HYPOTHYROIDISM: Primary | ICD-10-CM

## 2024-12-03 PROCEDURE — 99213 OFFICE O/P EST LOW 20 MIN: CPT | Performed by: INTERNAL MEDICINE

## 2024-12-03 RX ORDER — TRIAMTERENE AND HYDROCHLOROTHIAZIDE 37.5; 25 MG/1; MG/1
1 TABLET ORAL DAILY
COMMUNITY
Start: 2024-11-08

## 2024-12-03 NOTE — PROGRESS NOTES
Chief Complaint   Patient presents with    Hypothyroidism     Follow-up       HPI:   Reba Kong is a 74 y.o.female who returns to Endocrine Clinic for f/u evaluation and management of her hypothyroidism. Last visit 06/05/2024. Her history is as follows:    Interim Events:   - has occasional palpitations. Reports the palpitations at times occur when anxious. Zoloft dose recently increased to 50 mg.   - no glucocorticoids in the last 2 months  - pt completed labs on 11/27/2024, see below    1) acquired hypothyroidism:  -Patient was diagnosed with hypothyroidism in her 40s  -She recalls taking levothyroxine 137 mcg for several years  -In the fall 2023, she was seen by another provider in her PCPs office for a URI. A TSH collected at that time was slightly elevated and that provider increased her levothyroxine from 137 mcg to 150 mcg.  -In September 2023, the patient presented to [facility] ED and was found to be in atrial fibrillation.  Her TSH at that time 0.017.   - Her levothyroxine dose had been slowly decreased by her PCP.    (06/05/2024) Initial Endocrine Visit. Decreased her dose form 112 mcg to 100 mcg. I briefly looked at her gland with ultrasound in clinic which showed a small, atrophied gland without nodules    Current dose: Levothyroxine 100 mcg  -Takes in a.m. on an empty stomach.    - Does not take with any interfering factors  -Denies missed doses    Other history:   - h/o afib (started in 08/2023): currently on Eliquis      Review of Systems   Constitutional:  Negative for diaphoresis and fatigue.        Weight gain, mild   HENT: Negative.     Eyes: Negative.    Respiratory: Negative.     Cardiovascular:  Positive for palpitations (occasional).   Gastrointestinal:         Increased acid reflux   Endocrine: Negative.    Genitourinary: Negative.    Musculoskeletal:  Positive for arthralgias.   Skin: Negative.    Allergic/Immunologic: Negative.    Neurological: Negative.    Hematological:  "Negative.    Psychiatric/Behavioral: Negative.          H/o anxiety     The following portions of the patient's history were reviewed and updated as appropriate: allergies, current medications, past family history, past medical history, past social history, past surgical history and problem list.    /70 (BP Location: Left arm, Patient Position: Sitting, Cuff Size: Adult)   Pulse 60   Resp 16   Ht 172 cm (67.72\")   Wt 80.7 kg (178 lb)   SpO2 96%   BMI 27.29 kg/m²   Physical Exam  Vitals reviewed.   Constitutional:       General: She is not in acute distress.     Appearance: She is well-developed. She is not diaphoretic.   HENT:      Head: Normocephalic.   Eyes:      Conjunctiva/sclera: Conjunctivae normal.      Pupils: Pupils are equal, round, and reactive to light.   Neck:      Thyroid: No thyromegaly.      Trachea: No tracheal deviation.      Comments: No palpable thyroid nodules    Cardiovascular:      Rate and Rhythm: Normal rate and regular rhythm.      Heart sounds: Normal heart sounds. No murmur heard.  Pulmonary:      Effort: Pulmonary effort is normal. No respiratory distress.      Breath sounds: Normal breath sounds.   Lymphadenopathy:      Cervical: No cervical adenopathy.   Skin:     General: Skin is warm and dry.      Findings: No erythema.   Neurological:      Mental Status: She is alert and oriented to person, place, and time.      Cranial Nerves: No cranial nerve deficit.   Psychiatric:         Behavior: Behavior normal.         LABS/IMAGING: outside records reviewed and summarized in HPI  Results for orders placed or performed in visit on 11/27/24   TSH    Collection Time: 11/27/24 11:19 AM    Specimen: Blood   Result Value Ref Range    TSH 1.220 0.270 - 4.200 uIU/mL   T4, Free    Collection Time: 11/27/24 11:19 AM    Specimen: Blood   Result Value Ref Range    Free T4 1.32 0.92 - 1.68 ng/dL       ASSESSMENT/PLAN:  1) hypothyroidism, acquired:  - Pt was clinically euthyroid on exam.    - " TFTs normal on 11/27/2024.   - continue levothyroxine 100 mcg qAM  - Reviewed proper thyroid hormone administration, and factors to avoid that decrease medication potency and medication absorption.     RTC 6 months    Electronically Signed: Lenore Loya MD

## 2024-12-21 DIAGNOSIS — E03.9 ACQUIRED HYPOTHYROIDISM: Primary | ICD-10-CM

## 2025-03-13 ENCOUNTER — TELEPHONE (OUTPATIENT)
Dept: ORTHOPEDIC SURGERY | Facility: CLINIC | Age: 75
End: 2025-03-13

## 2025-03-13 ENCOUNTER — OFFICE VISIT (OUTPATIENT)
Dept: ORTHOPEDIC SURGERY | Facility: CLINIC | Age: 75
End: 2025-03-13
Payer: MEDICARE

## 2025-03-13 VITALS
BODY MASS INDEX: 27.28 KG/M2 | DIASTOLIC BLOOD PRESSURE: 86 MMHG | HEIGHT: 68 IN | SYSTOLIC BLOOD PRESSURE: 128 MMHG | WEIGHT: 180 LBS

## 2025-03-13 DIAGNOSIS — M16.11 PRIMARY OSTEOARTHRITIS OF RIGHT HIP: ICD-10-CM

## 2025-03-13 DIAGNOSIS — M79.18 PAIN IN RIGHT BUTTOCK: ICD-10-CM

## 2025-03-13 DIAGNOSIS — M76.01 GLUTEAL TENDONITIS OF RIGHT BUTTOCK: ICD-10-CM

## 2025-03-13 DIAGNOSIS — M25.551 RIGHT HIP PAIN: Primary | ICD-10-CM

## 2025-03-13 PROCEDURE — 1160F RVW MEDS BY RX/DR IN RCRD: CPT | Performed by: PHYSICIAN ASSISTANT

## 2025-03-13 PROCEDURE — 1159F MED LIST DOCD IN RCRD: CPT | Performed by: PHYSICIAN ASSISTANT

## 2025-03-13 PROCEDURE — 99214 OFFICE O/P EST MOD 30 MIN: CPT | Performed by: PHYSICIAN ASSISTANT

## 2025-03-13 RX ORDER — MELOXICAM 15 MG/1
TABLET ORAL
Qty: 30 TABLET | Refills: 1 | Status: SHIPPED | OUTPATIENT
Start: 2025-03-13

## 2025-03-13 NOTE — TELEPHONE ENCOUNTER
[facility] IS CALLING IN REQUESTING CLARIFICATION ON THE MELOXICAM SCRIPT THAT WAS SENT IN. THEY ARE NEEDING TO VERIFY THAT THE DOCTOR IS AWARE THE PATIENT IS ON ELIQUIS.     IF MIRIAM OR CLINCAL STAFF COULD PLEASE ADVISE.     CALL BACK # [phone]

## 2025-03-13 NOTE — TELEPHONE ENCOUNTER
I called and spoke with the pharmacy. Keely is aware patient is on eliquis and sertraline. We will hold on filling the meloxicam till the patient messages us with cardiac clearance to take short term meloxicam .

## 2025-03-13 NOTE — PROGRESS NOTES
[facility] [place] [facility] Note    Subjective     Chief Complaint   Patient presents with    Right Hip - Pain        HPI  Reba Kong is a 74 y.o. female.  Established patient presents for evaluation of right hip pain, place his hand on lateral aspect of the hip and buttock region as source.  Symptoms/pain have been ongoing for about a month.  GLORIA: No history of injury or trauma.    Pain scale: 5/10.  Severity of the pain moderate.  Quality of the pain aching.  Associated symptoms pain, stiffness, giving way.  Activity related to pain walking, standing, sitting, stairs, sleeping, lying on the affected side, rising from a seated position.  Pain eased by nothing.  No reported numbness or tingling.      Denies fever, chills, night sweats or other constitutional symptoms.    Past Medical History:   Diagnosis Date    Abnormal ECG 6/23    Asthma 2020 Covid    Atrial fibrillation 9/23    Hiatal hernia with GERD     Hyperlipidemia     Hypothyroidism     Knee swelling 4/24    PUD (peptic ulcer disease) 2015    Tear of meniscus of knee 4/24      Past Surgical History:   Procedure Laterality Date    COLONOSCOPY W/ POLYPECTOMY      ENDOSCOPY  2015    HYSTERECTOMY  2003    OOPHORECTOMY        Family History   Problem Relation Age of Onset    Lung cancer Mother     Cancer Mother     Leukemia Father     Cancer Father     Heart failure Brother     Heart failure Maternal Grandfather     Breast cancer Neg Hx     Ovarian cancer Neg Hx     Endometrial cancer Neg Hx      Social History     Socioeconomic History    Marital status:     Number of children: 1   Tobacco Use    Smoking status: Never     Passive exposure: Past    Smokeless tobacco: Never   Vaping Use    Vaping status: Never Used   Substance and Sexual Activity    Alcohol use: Yes     Alcohol/week: 2.0 standard drinks of alcohol     Types: 2 Drinks containing 0.5 oz of alcohol per week     Comment: Social    Drug use: Never    Sexual activity: Not Currently  "    Partners: Male     Birth control/protection: None     Comment: NA      Current Outpatient Medications on File Prior to Visit   Medication Sig Dispense Refill    apixaban (Eliquis) 5 MG tablet tablet Take 1 tablet by mouth twice daily 180 tablet 1    hydrOXYzine (ATARAX) 10 MG tablet Take  by mouth As Needed.      levothyroxine (SYNTHROID, LEVOTHROID) 100 MCG tablet Take 1 tablet by mouth Every Morning. 90 tablet 3    sertraline (ZOLOFT) 50 MG tablet Take 1 tablet by mouth every night at bedtime.      triamterene-hydrochlorothiazide (MAXZIDE-25) 37.5-25 MG per tablet Take 1 tablet by mouth Daily.       No current facility-administered medications on file prior to visit.      Allergies   Allergen Reactions    Penicillins Rash        The following portions of the patient's history were reviewed and updated as appropriate: allergies, current medications, past family history, past medical history, past social history, past surgical history, and problem list.    Review of Systems   Constitutional: Negative.    HENT: Negative.     Eyes: Negative.    Respiratory: Negative.     Cardiovascular: Negative.    Gastrointestinal: Negative.    Endocrine: Negative.    Genitourinary: Negative.    Musculoskeletal:  Positive for arthralgias.   Skin: Negative.    Allergic/Immunologic: Negative.    Neurological: Negative.    Hematological: Negative.    Psychiatric/Behavioral: Negative.          Objective      Physical Exam  /86   Ht 172 cm (67.72\")   Wt 81.6 kg (180 lb)   BMI 27.60 kg/m²     Body mass index is 27.6 kg/m².    GENERAL APPEARANCE: awake, alert & oriented x 3, in no acute distress and well developed, well nourished  PSYCH: normal mood and affect  LUNGS:  breathing nonlabored, no wheezing  EYES: sclera anicteric, pupils equal  CARDIOVASCULAR: palpable pulses. Capillary refill less than 2 seconds  INTEGUMENTARY: skin intact, no clubbing, cyanosis  NEUROLOGIC:  Normal Sensation         Ortho Exam  Right hip  Skin: " Grossly intact without any erythema, warmth or swelling.  Tenderness: Anterior hip/flexors negative.  Groin negative.  Lateral hip/GT/abductor/gluteal tendons positive.  Motion: Flx 110°, internal rotation 30°, external rotation 30°.   Testing: Stinchfield negative for groin pain, Passive hip flx to 90° with IR/ER negative for groin pain.  Strength: Hip flx/ext/abd 5/5, Q/HS 5/5.  Motor: Grossly intact Q/HS/TA/GS/EHL/P.  Sensory: Grossly intact to LT L2-S1.      Imaging/Studies  Ordered right hip plain films.  Imaging read/interpreted by Dr. Ross.    Imaging Results (Last 7 Days)       Procedure Component Value Units Date/Time    XR Hip With or Without Pelvis 2 - 3 View Right [[id number]] Resulted: 03/13/25 0936     Updated: 03/13/25 0936    Narrative:      Indication: Right hip pain    Comparison: No prior xrays are available for comparison      Impression:           AP pelvis, right hip: moderate joint space narrowing,  minimal osteophyte   formation.  No acute bony injury or fracture.  AP pelvis, left hip: moderate joint space narrowing,  minimal osteophyte   formation.  No acute bony injury or fracture.              Assessment/Plan        ICD-10-CM ICD-9-CM   1. Right hip pain  M25.551 719.45   2. Pain in right buttock  M79.18 729.1   3. Gluteal tendonitis of right buttock  M76.01 726.5   4. Primary osteoarthritis of right hip  M16.11 715.15       Orders Placed This Encounter   Procedures    XR Hip With or Without Pelvis 2 - 3 View Right        -Right hip, right buttock pain due to gluteal tendinitis in setting of right hip osteoarthritis.  -Reviewed imaging with the patient.  -Preserved and asymptomatic hip range of motion.  Therefore unlikely to be hip arthritis as a source of her pain.  -Offered formal referral to PT but patient declined.  Instead she excepted provider directed HEP.  -To contact cardiologist regarding use of NSAID since she is on Eliquis.  Tentatively have prescribed meloxicam for her to  take for 2 to 3 weeks.  -Follow up in 6 weeks for repeat evaluation.  May require corticosteroid injection to area versus advanced imaging depending on how her symptoms are doing.  -Questions and concerns answered.      Medical Decision Making  Management Options : prescription/IM medicine and physical/occupational therapy  Data/Risk: radiology tests      Keely Johnson PA-C  03/13/25  09:46 EDT               EMR Dragon/Transcription disclaimer:  Much of this encounter note is an electronic transcription of spoken language to printed text. Electronic transcription of spoken language may permit erroneous, or at times, nonsensical words or phrases to be inadvertently transcribed. Although I have reviewed the note for such errors, some may still exist.

## 2025-03-21 DIAGNOSIS — E03.9 HYPOTHYROIDISM, UNSPECIFIED TYPE: Primary | ICD-10-CM

## 2025-03-28 ENCOUNTER — LAB (OUTPATIENT)
Facility: HOSPITAL | Age: 75
End: 2025-03-28
Payer: MEDICARE

## 2025-03-28 DIAGNOSIS — E03.9 HYPOTHYROIDISM, UNSPECIFIED TYPE: ICD-10-CM

## 2025-03-28 PROCEDURE — 84443 ASSAY THYROID STIM HORMONE: CPT

## 2025-03-28 PROCEDURE — 84439 ASSAY OF FREE THYROXINE: CPT

## 2025-03-29 LAB
T4 FREE SERPL-MCNC: 1.43 NG/DL (ref 0.92–1.68)
TSH SERPL DL<=0.05 MIU/L-ACNC: 0.82 UIU/ML (ref 0.27–4.2)

## 2025-04-14 ENCOUNTER — OFFICE VISIT (OUTPATIENT)
Dept: ORTHOPEDIC SURGERY | Facility: CLINIC | Age: 75
End: 2025-04-14
Payer: MEDICARE

## 2025-04-14 DIAGNOSIS — M79.605 PAIN OF LEFT LOWER EXTREMITY: ICD-10-CM

## 2025-04-14 DIAGNOSIS — M25.572 LEFT ANKLE PAIN, UNSPECIFIED CHRONICITY: Primary | ICD-10-CM

## 2025-04-14 PROCEDURE — 1159F MED LIST DOCD IN RCRD: CPT | Performed by: ORTHOPAEDIC SURGERY

## 2025-04-14 PROCEDURE — 1160F RVW MEDS BY RX/DR IN RCRD: CPT | Performed by: ORTHOPAEDIC SURGERY

## 2025-04-14 PROCEDURE — 99214 OFFICE O/P EST MOD 30 MIN: CPT | Performed by: ORTHOPAEDIC SURGERY

## 2025-04-14 NOTE — PROGRESS NOTES
[facility] [place] Orth[facility] Office Visit     Office Visit       Date: 04/14/2025   Patient Name: Reba Kong  MRN: [id number]  YOB: 1950    Referring Physician: No ref. provider found     Chief Complaint:   Chief Complaint   Patient presents with    Left Ankle - Pain     Fall 04/13/2025       History of Present Illness: Reba Kong is a 74 y.o. female who is here today with chief complaint of left leg and ankle pain.  States injured ankle while mowing grass yesterday.  Had difficulty ambulating following her injury.  Comes in today ambulating in tall cam walking boot using rolling walker.  States can stand putting weight on left leg but it does hurt more in her leg than in her ankle.  Denies any other injuries or sources of pain.    Subjective   Review of Systems: Review of Systems   Constitutional: Negative.  Negative for chills, fatigue and fever.   HENT: Negative.  Negative for congestion and dental problem.    Eyes: Negative.  Negative for blurred vision.   Respiratory: Negative.  Negative for shortness of breath.    Cardiovascular: Negative.  Negative for leg swelling.   Gastrointestinal: Negative.  Negative for abdominal pain.   Endocrine: Negative.  Negative for polyuria.   Genitourinary: Negative.  Negative for difficulty urinating.   Musculoskeletal:  Positive for arthralgias.   Skin: Negative.    Allergic/Immunologic: Negative.    Neurological: Negative.    Hematological: Negative.  Negative for adenopathy.   Psychiatric/Behavioral: Negative.  Negative for behavioral problems.         Past Medical History:   Past Medical History:   Diagnosis Date    Abnormal ECG 6/23    Asthma 2020 Covid    Atrial fibrillation 9/23    Hiatal hernia with GERD     Hyperlipidemia     Hypothyroidism     Knee swelling 4/24    PUD (peptic ulcer disease) 2015    Tear of meniscus of knee 4/24       Past Surgical History:   Past Surgical History:   Procedure Laterality  Date    COLONOSCOPY W/ POLYPECTOMY      ENDOSCOPY  2015    HYSTERECTOMY  2003    OOPHORECTOMY         Family History:   Family History   Problem Relation Age of Onset    Lung cancer Mother     Cancer Mother     Leukemia Father     Cancer Father     Heart failure Brother     Heart failure Maternal Grandfather     Breast cancer Neg Hx     Ovarian cancer Neg Hx     Endometrial cancer Neg Hx        Social History:   Social History     Socioeconomic History    Marital status:     Number of children: 1   Tobacco Use    Smoking status: Never     Passive exposure: Past    Smokeless tobacco: Never   Vaping Use    Vaping status: Never Used   Substance and Sexual Activity    Alcohol use: Yes     Alcohol/week: 2.0 standard drinks of alcohol     Types: 2 Drinks containing 0.5 oz of alcohol per week     Comment: Social    Drug use: Never    Sexual activity: Not Currently     Partners: Male     Birth control/protection: None     Comment: NA       Medications:   Current Outpatient Medications:     apixaban (Eliquis) 5 MG tablet tablet, Take 1 tablet by mouth twice daily, Disp: 180 tablet, Rfl: 1    hydrOXYzine (ATARAX) 10 MG tablet, Take  by mouth As Needed., Disp: , Rfl:     levothyroxine (SYNTHROID, LEVOTHROID) 100 MCG tablet, Take 1 tablet by mouth Every Morning., Disp: 90 tablet, Rfl: 3    meloxicam (MOBIC) 15 MG tablet, 1 PO Daily with food.  Stop if you have upset stomach/stomach irritation., Disp: 30 tablet, Rfl: 1    sertraline (ZOLOFT) 50 MG tablet, Take 1 tablet by mouth every night at bedtime., Disp: , Rfl:     triamterene-hydrochlorothiazide (MAXZIDE-25) 37.5-25 MG per tablet, Take 1 tablet by mouth Daily., Disp: , Rfl:     vitamin D (ERGOCALCIFEROL) 1.25 MG (50324 UT) capsule capsule, TAKE 1 CAPSULE BY MOUTH ONCE A WEEK FOR 12 WEEKS, THEN RECHECK LABS IN 3 MONTHS, Disp: , Rfl:     Allergies:   Allergies   Allergen Reactions    Penicillins Rash       I reviewed the patient's chief complaint, history of present  illness, review of systems, past medical history, surgical history, family history, social history, medications and allergy list.     Objective    Vital Signs: There were no vitals filed for this visit.  There is no height or weight on file to calculate BMI.    Ortho Exam:  left LE Foot and Ankle Exam:   Able to stand putting full weight on left ankle in clinic.  Difficulty ambulating due to pain when taking steps.  Neurological exam of the superficial peroneal, deep peroneal, plantar, sural and saphenous nerves demonstrates intact sensation and normal motor function.   There is good perfusion to the toes.   The skin is intact throughout the foot and ankle without ulceration.   Range of motion of ankle, subtalar joint, midfoot and toes is within normal limits.   There is tenderness to palpation over the lateral ankle ligaments, mild tenderness to palpation medially over deltoid ligament, there is also some tenderness about the soft tissues of her proximal lateral leg as well as some focal tenderness over the area of the proximal fibula.    Results Review:   Imaging Results (Last 24 Hours)       Procedure Component Value Units Date/Time    XR Tibia Fibula 2 View Left [[id number]] Resulted: 04/14/25 1329     Updated: 04/14/25 1329    Narrative:      Left Tib/Fib X-Ray 04/14/25   Indication: Pain  Views: 2 non - weight bearing , comparison none  Findings: xrays reviewed by me today in the office and show ankle mortise   congruent well-maintained with no signs of syndesmotic widening on   weightbearing views, possible small avulsion fractures visible at the tip   of the distal fibula as well as medial malleolus however these could be   chronic in nature, there is a proximal fibular fracture identified on   nonweightbearing tib-fib films, fracture pattern appears transverse      XR Ankle 3+ View Left [[id number]] Resulted: 04/14/25 1329     Updated: 04/14/25 1329    Narrative:      Left Ankle X-Ray 04/14/25   Indication:  Pain  Views: 3 weight bearing , comparison to previous  Findings: xrays reviewed by me today in the office and show ankle mortise   congruent well-maintained with no signs of syndesmotic widening on   weightbearing views, possible small avulsion fractures visible at the tip   of the distal fibula as well as medial malleolus however these could be   chronic in nature, there is a proximal fibular fracture identified on   nonweightbearing tib-fib films, fracture pattern appears transverse                 Assessment / Plan    Assessment/Plan:   Diagnoses and all orders for this visit:    1. Left ankle pain, unspecified chronicity (Primary)  -     XR Ankle 3+ View Left  -     XR Tibia Fibula 2 View Left  -     MRI Ankle Left Without Contrast; Future    2. Pain of left lower extremity  -     MRI Ankle Left Without Contrast; Future      Discussed acute left leg and ankle injury (an injury with risk of long term functional impairment) with patient as well as treatment options at length. Decision regarding surgical intervention considered.  Patient with proximal fibular fracture.  Injury concerning for Maisonneuve injury.  Patient can continue toe-touch weightbearing in cam walking boot using walker.  Provided with stat MRI left ankle to assess for injuries most concerning to the syndesmosis.  Plan to see patient back following completion of MRI for further management.  Recommend Tylenol for pain as well as aggressive ice and elevation.    Follow Up:   Return for F/U following completion of advanced imaging.      Tobin Adam MD  [facility] [place] [facility] Surgeon

## 2025-04-16 ENCOUNTER — HOSPITAL ENCOUNTER (OUTPATIENT)
Facility: HOSPITAL | Age: 75
Discharge: HOME | End: 2025-04-16
Admitting: ORTHOPAEDIC SURGERY
Payer: MEDICARE

## 2025-04-16 DIAGNOSIS — M25.572 LEFT ANKLE PAIN, UNSPECIFIED CHRONICITY: ICD-10-CM

## 2025-04-16 DIAGNOSIS — M79.605 PAIN OF LEFT LOWER EXTREMITY: ICD-10-CM

## 2025-04-16 PROCEDURE — 73721 MRI JNT OF LWR EXTRE W/O DYE: CPT

## 2025-04-21 ENCOUNTER — OFFICE VISIT (OUTPATIENT)
Dept: ORTHOPEDIC SURGERY | Facility: CLINIC | Age: 75
End: 2025-04-21
Payer: MEDICARE

## 2025-04-21 VITALS — WEIGHT: 179 LBS | HEIGHT: 67 IN | BODY MASS INDEX: 28.09 KG/M2

## 2025-04-21 DIAGNOSIS — S82.832D CLOSED FRACTURE OF PROXIMAL END OF LEFT FIBULA WITH ROUTINE HEALING, UNSPECIFIED FRACTURE MORPHOLOGY, SUBSEQUENT ENCOUNTER: Primary | ICD-10-CM

## 2025-04-21 NOTE — PROGRESS NOTES
"                          [facility] [place] [facility] Follow Up     Office Follow Up Visit     Date: 04/21/2025   Patient Name: Reba Kong  MRN: [id number]  YOB: 1950  Chief Complaint:   Chief Complaint   Patient presents with    Follow-up     1 WEEK FOLLOW UP- Left ankle pain, unspecified chronicity-MRI PREFORMED 4/16/25       History of Present Illness:   Reba Kong is a 74 y.o. female who is here today for follow-up for left leg injury.  Has been weightbearing in boot with walker since last seen in clinic on April 13.  Proximal fibular fracture with concern for possible Maisonneuve injury.  Received MRI since previous visit.  States pain and swelling of right ankle much improved.  Has become more active going longer distances since last seen in clinic due to symptom improvement.  States still having some proximal lateral leg pain but denies pain today in her ankle.    Subjective   I reviewed the patient's chief complaint, history of present illness, review of systems, past medical history, surgical history, family history, social history, medications and allergy list   Objective    Vital Signs:   Vitals:    04/21/25 1327   Weight: 81.2 kg (179 lb)   Height: 170.2 cm (67\")     Body mass index is 28.04 kg/m².    Ortho Exam:  left LE Foot and Ankle Exam:   Able to stand putting full weight on left ankle in clinic.  Takes several steps in clinic out of boot with walker with no ankle pain and minimal discomfort about the leg.  Neurological exam of the superficial peroneal, deep peroneal, plantar, sural and saphenous nerves demonstrates intact sensation and normal motor function.   There is good perfusion to the toes.   The skin is intact throughout the foot and ankle without ulceration.   Range of motion of ankle, subtalar joint, midfoot and toes is within normal limits.   Minimal tenderness about ankle both laterally and medially on exam today.  Continues to be some focal tenderness " over the area of the proximal fibula.      Results Review:  MRI Ankle Left Without Contrast  Narrative: MRI ANKLE LEFT  WO CONTRAST    Date of Exam: 4/16/2025 2:37 PM EDT    Indication: concern for maisonneuve injury, syndesmotic injury.     Comparison: Radiographs conducted 4/14/2025    Technique:  Routine multiplanar/multisequence images of the left ankle were obtained without contrast administration.    FINDINGS:  Mild changes of tendinopathy and tenosynovitis are noted involving the posterior tibialis and flexor digitorum longus tendons. There is no tendon disruption or retraction. The flexor hallucis longus tendon is intact.    Mild changes of tendinopathy are noted involving the peroneus longus and brevis tendons. There is no tendon tear or retraction. The anterior extensor tendons of the ankle are intact.    Mild to moderate changes of distal Achilles tendinopathy are noted. There is no Achilles tendon tear. There is no retraction of torn fibers. Mild changes of plantar fasciitis are noted. There is no disruption of the plantar fascia.    There is thickening and increased signal of the deltoid ligament complex. Areas of partial ossification are noted. These findings suggest the sequelae of remote prior injury. There is no evidence for acute injury of the deltoid ligament complex. There is   no acute avulsion fracture at the medial malleolus. The spring ligament is intact.    The anterior talofibular ligament, calcaneal fibular ligament, and posterior talofibular ligament are intact. The anterior and posterior tibiofibular ligaments are intact.    There is no joint effusion. There is no evidence for osteochondral injury of the talar dome. The articulations of the ankle, hindfoot, and midfoot are intact. No abnormal focal bone marrow signal is identified. There is no evidence of stress injury or   stress reaction. The cortical margins are intact.     Nonspecific edema is observed in the soft tissues surrounding  the ankle. No abnormal focal fluid collections are seen within the surrounding soft tissues.  Impression: 1.Mild changes of tendinopathy and tenosynovitis of the posterior tibialis and flexor digitorum longus tendons. There is no tendon tear or retraction.  2.Mild tendinopathy involving the peroneus longus and brevis tendons. There is no tendon disruption or retraction.  3.Mild to moderate changes of distal Achilles tendinopathy. There is no Achilles tendon tear. There is no retraction of torn fibers.  4.Mild changes of planter fasciitis. There is no disruption of the plantar fascia.  5.Evidence for partial remote injury of the deltoid ligament complex with scarring and partial ossification. There is no evidence for acute injury. There is no acute avulsion injury at the medial malleolus.  6.No evidence for stress injury or stress reaction. There is no fracture or contusion.    Electronically Signed: Scot Toney MD    4/16/2025 3:37 PM EDT    Workstation ID: [id number]     04/22/25 I have personally reviewed and interpreted the images from outside facility with the documented findings, no acute MRI findings consistent with syndesmotic injury or deltoid disruption    Assessment / Plan    Assessment/Plan:   Diagnoses and all orders for this visit:    1. Closed fracture of proximal end of left fibula with routine healing, unspecified fracture morphology, subsequent encounter (Primary)      Patient returns to clinic for further management of her left ankle injury with proximal fibular fracture.  MRI findings are not consistent with Maisonneuve injury patient does not appear to have instability about the ankle.  Does have some pain in her left leg at area of proximal fibula fracture but pain has significantly improved since I saw patient a week ago in clinic.  Plan for continued conservative management of her left leg injury with proximal fibular fracture.  Provided patient with some exercises she can do at home.   Patient can continue to ambulate with walker in boot, provided with an even up in clinic today.  Plan to see her back in 1 month for repeat x-rays and reevaluation.  Will plan for x-ray of proximal fibula at that visit.  Happy with her progress.  It was a pleasure seeing her today.    Follow Up:   Return in about 1 month (around 5/21/2025) for F/u Recheck with images.      Tobin Adam MD  [facility] [place] Orthopedic Surgeon

## 2025-04-21 NOTE — PATIENT INSTRUCTIONS
Normal Ankle Function Restorative Exercises    Range of Motion Exercises:   Perform these exercises to help regain normal ankle motion.    Foot hold:  Sit with the knee straight and hold the foot in it's natural position for as long as possible. Perform as frequently as possible for the first 3-10 days.     Pullback:  Sit with the knee straight and flew your foot back toward your body. Perform 10 repetitions and 3 sets.        Flexibility (Stretching) Exercises:  The goal of these exercises is to loosen tight leg muscles. Tightness makes it difficult to climb stairs, walk, run and jump.   Technique: Hold each exercise for 20 seconds for a gentle stretch.  Frequency: 6-10 repetitions, 5-7 days per week for each exercise.    Basic Calf Stretch:  Sit with the knee straightened and loop a towel around the ball of your foot. Slowly pull back until you feel our upper calf stretch.        Advanced Calf Stretch:  Once able to stand, stretch with hands on a wall.  Place one foot behind the other with toes pointing forward.  Keep heels down and back leg straight.  Slowly bend your front knee until you feel the calf stretch in the back leg.        Basic Heel Stretch:  Sit with the knee slightly bent and loop a towel around the ball of the outstretched foot. Slowly pull back until you feel a stretch in the lower calf and heel.        Advanced Heel Stretch:  Once able to stand, try placing injured foot behind the other foot while keeping the toes forward. While keeping the heels down, slowly bend the back knee until you feel a heel stretch in the back leg.        Strengthening Exercises:  Strong leg muscles are vital to help the ligaments hold the ankle together. Perform 3 sets of 20 repetitions, 5-7 days per week for these exercises.    Basic Front of Shin:   With foot flat on the floor, push outward against a wall, file cabinet or book case. Hold for three seconds.        Advanced Front of Shin:  Use a band and tie one end to  a desk or dresser. Sit with foot and knee in line and loop the band over the outside of the foot. Push foot out against the band.      Basic Inner Shin:  With foot flat on the floor, push inward against the other foot and hold for three seconds.      Advanced Inner Shin:  Tie a band to a desk or dresser. Sit with foot and knee in line and loop band over inside of the foot. Push foot against the band.    Basic Front of Shin:  Place the heel of uninjured foot on top of the injured foot. Push down with the top heel while trying to push up with the injured foot. Hold for three seconds.      Advanced Front of Shin:  Tie a band to a desk or dresser. Sit with leg straight and loop band over the top of the foot. Slowly pull foot back against the band.      References  1. [employer]. Ankle Range of Motion Exercises. Available from: [email or web address]/  2. Pillo RAINEY, Catia F, Shasha N. Ankle instability. Sports Med Arthrosc. 2009;17(2):139-145.  3. Conor CAZARES. Relationship between balance ability, training and sports injury risk. Sports Med. 2007;37(6):547-56.

## 2025-04-22 PROBLEM — S82.832D CLOSED FRACTURE OF PROXIMAL END OF LEFT FIBULA WITH ROUTINE HEALING: Status: ACTIVE | Noted: 2025-04-22

## 2025-05-14 ENCOUNTER — TELEPHONE (OUTPATIENT)
Dept: ORTHOPEDIC SURGERY | Facility: CLINIC | Age: 75
End: 2025-05-14
Payer: MEDICARE

## 2025-05-14 NOTE — TELEPHONE ENCOUNTER
I called the patient to discuss rash. She states she has started no new medications . I asked her if it began after being in the boot and she said yes. She states she only wears the boot when she is out walking. We elected to have her come in Friday morning before she heads out of town to check the rash and possibly discontinue the boot.

## 2025-05-16 ENCOUNTER — OFFICE VISIT (OUTPATIENT)
Dept: ORTHOPEDIC SURGERY | Facility: CLINIC | Age: 75
End: 2025-05-16
Payer: MEDICARE

## 2025-05-16 VITALS
SYSTOLIC BLOOD PRESSURE: 130 MMHG | HEIGHT: 67 IN | BODY MASS INDEX: 28.09 KG/M2 | DIASTOLIC BLOOD PRESSURE: 80 MMHG | WEIGHT: 179 LBS

## 2025-05-16 DIAGNOSIS — M25.572 LEFT ANKLE PAIN, UNSPECIFIED CHRONICITY: Primary | ICD-10-CM

## 2025-05-16 NOTE — PROGRESS NOTES
"                          [facility] [place] [facility] Follow Up     Office Follow Up Visit     Date: 05/16/2025   Patient Name: Reba Kong  MRN: [id number]  YOB: 1950  Chief Complaint:   Chief Complaint   Patient presents with    Follow-up     3 week follow up--Closed fracture of proximal end of left fibula with routine healing, unspecified fracture morphology     History of Present Illness:   Reba Kong is a 74 y.o. female who is here today for follow-up for her left leg injury.  States developed a rash on her left leg approximately 1 week ago.  Contacted clinic a couple days later and discontinued wearing her boot.  States rash is unchanged since discontinuing her boot.  Tried Benadryl and what sounds like a cortisone cream with no change in the symptoms.  States it itches and is red.  With respect to her ankle she states symptoms continue to improve.  States feels like her left leg and ankle are still slightly stiff and weak but that is improving.    Subjective   I reviewed the patient's chief complaint, history of present illness, review of systems, past medical history, surgical history, family history, social history, medications and allergy list   Objective    Vital Signs:   Vitals:    05/16/25 0915   BP: 130/80   Weight: 81.2 kg (179 lb)   Height: 170.2 cm (67.01\")     Body mass index is 28.03 kg/m².    Ortho Exam:  left LE Foot and Ankle Exam:   Neurological exam of the superficial peroneal, deep peroneal, plantar, sural and saphenous nerves demonstrates intact sensation and normal motor function.   There is good perfusion to the toes.   Range of motion of ankle, subtalar joint, midfoot and toes is within normal limits.   Minimal tenderness about ankle both laterally and medially on exam today.  Continues to be some focal tenderness over the area of the proximal fibula.  No focal tenderness about the ankle on exam today.  Some tenderness over the proximal fibula.  Ambulates " in clinic with normal nonantalgic gait.  Slightly erythematous raised bumps over the anterior lateral leg.  Skin intact.  Minimally tender.  No palpable crepitus.    Results Review:  No new imaging    Assessment / Plan    Assessment/Plan:   Diagnoses and all orders for this visit:    1. Left ankle pain, unspecified chronicity (Primary)      Returns to clinic for slightly early follow-up due to rash that she is developed on her left leg.  Rash developed a week ago approximately.  She has been wearing the boot for about the last month.  Discussed with her I do not think the boot is likely contributory to her rash and I do not think her injury is as well.  She has discontinued the boot with no improvement of symptoms.  Discussed with patient I recommend she contact her PCP for further management of rash and/or possible referral to dermatology.  Patient expressed understanding and agreement with plan with regard to rash management.  Patient provided with an ankle brace in clinic today.  Reports she is doing home exercises for left lower extremity weakness and stiffness.  We discussed therapy however patient does not want to do therapy.  Plans to continue to do exercises at home wearing brace as needed.  Will plan to see her back in 1 month for repeat x-rays and reevaluation.  Counseled her to contact the clinic sooner should any other concerns arise.  It was a pleasure seeing her today.  I am happy with her progress with regard to her recovery from her leg injury.    Follow Up:   Return in about 1 month (around 6/16/2025) for F/u Recheck with images.      Tobin Adam MD  [facility] [place] Orthopedic Surgeon

## 2025-06-12 ENCOUNTER — OFFICE VISIT (OUTPATIENT)
Dept: ORTHOPEDIC SURGERY | Facility: CLINIC | Age: 75
End: 2025-06-12
Payer: MEDICARE

## 2025-06-12 VITALS
HEIGHT: 67 IN | SYSTOLIC BLOOD PRESSURE: 148 MMHG | WEIGHT: 179 LBS | BODY MASS INDEX: 28.09 KG/M2 | DIASTOLIC BLOOD PRESSURE: 76 MMHG

## 2025-06-12 DIAGNOSIS — M17.12 PRIMARY OSTEOARTHRITIS OF LEFT KNEE: ICD-10-CM

## 2025-06-12 DIAGNOSIS — S83.412A SPRAIN OF MEDIAL COLLATERAL LIGAMENT OF LEFT KNEE, INITIAL ENCOUNTER: ICD-10-CM

## 2025-06-12 DIAGNOSIS — S82.832A OTHER CLOSED FRACTURE OF PROXIMAL END OF LEFT FIBULA, INITIAL ENCOUNTER: ICD-10-CM

## 2025-06-12 DIAGNOSIS — M25.562 LEFT KNEE PAIN, UNSPECIFIED CHRONICITY: Primary | ICD-10-CM

## 2025-06-12 DIAGNOSIS — Z79.01 CHRONIC ANTICOAGULATION: ICD-10-CM

## 2025-06-12 DIAGNOSIS — R93.7 ABNORMAL MUSCULOSKELETAL DIAGNOSTIC IMAGING: ICD-10-CM
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
## 2025-06-12 NOTE — PROGRESS NOTES
[facility] [place] [facility] Surgery Clinic Note        Subjective     CC: Follow-up (8 month follow up--Primary osteoarthritis of left knee)  Hx fall 4/13/2025    HPI    Reba Kong is a 74 y.o. female.  Established patient presents for evaluation of left knee pain.  GLORIA: Patient is been previously seen for left knee arthritis and provided viscosupplementation series which she completed 8/26/2024.  She states the injections are working great.  Unfortunately she had a fall 4/13/2025, valgus stress to the knee.  Since then she has had pain predominantly medial aspect of the knee/proximal tibia with weightbearing.  She is also has some occasional tenderness lateral knee.    Pain scale: 1-2/10.  Associated symptoms pain, giving way/instability.  Activity related to pain walking.  Pain eased by resting.  No reported numbness or tingling.  Prior treatments knee compression sleeve.    No reported mechanical symptoms, such as locking or catching.    Overall, patient's symptoms are better to the knee but increased pain proximal medial tibia.    ROS:    Constiutional:Pt denies fever, chills, nausea, or vomiting.  MSK:as above        Objective      Past Medical History  Past Medical History:   Diagnosis Date    Abnormal ECG 6/23    Asthma 2020 Covid    Atrial fibrillation 9/23    Hiatal hernia with GERD     Hyperlipidemia     Hypothyroidism     Knee swelling 4/24    PUD (peptic ulcer disease) 2015    Tear of meniscus of knee 4/24     Social History     Socioeconomic History    Marital status:     Number of children: 1   Tobacco Use    Smoking status: Never     Passive exposure: Past    Smokeless tobacco: Never   Vaping Use    Vaping status: Never Used   Substance and Sexual Activity    Alcohol use: Yes     Alcohol/week: 2.0 standard drinks of alcohol     Types: 2 Drinks containing 0.5 oz of alcohol per week     Comment: Social    Drug use: Never    Sexual activity: Not Currently     Partners: Male     Birth  "control/protection: None     Comment: NA          Physical Exam  /76   Ht 170.2 cm (67.01\")   Wt 81.2 kg (179 lb)   BMI 28.03 kg/m²     Body mass index is 28.03 kg/m².    Patient is well nourished and well developed.        Ortho Exam  Left knee  Alignment: Genu varum  Skin: Intact without any erythema, warmth.  Trace effusion  Motion: 0-120° with crepitus.  Tenderness: Positive tenderness noted proximal medial tibia.  Mild discomfort over fibular head.  Instability: Lachman negative.  Varus stress test negative.  Valgus stress test positive laxity.   Straight leg raise: Intact.  Motor: Grossly intact Q/HS/TA/GS/EHL/P  Sensory: Grossly intact DP/SP/S/S/T nerve distributions.       Imaging/Labs/EMG Reviewed and Interpreted:  Imaging Results (Last 24 Hours)       Procedure Component Value Units Date/Time    XR Knee 4+ View Left [[id number]] Resulted: 06/12/25 0906     Updated: 06/12/25 0906    Narrative:      Indication: Left knee pain    Comparison: Todays xrays were compared to previous xrays from 8/12/2024      Impression:           Left Knee: moderate to severe tricompartmental arthritis with genu varum   alignment, periarticular osteophytes visualized in all compartments.    There is a cortical irregularity of the medial tibial plateau that appears   more progressive compared to prior imaging.  There is also callus   formation adjacent to the fibular neck that was not identified previously   indicative of age-indeterminate fibular neck fracture.              Assessment:  1. Left knee pain, unspecified chronicity    2. Sprain of medial collateral ligament of left knee, initial encounter    3. Abnormal musculoskeletal diagnostic imaging    4. Other closed fracture of proximal end of left fibula, initial encounter    5. Primary osteoarthritis of left knee    6. Accidental fall, initial encounter    7. Chronic anticoagulation        Plan:  Left knee pain due to sprain MCL, MCL laxity (suspect tear based on " exam), bony/cortical irregularity medial tibial plateau (concern for fracture) in setting of patient with primary osteoarthritis.  Left proximal fibular fracture--imaging shows early signs of callus.  These types of fractures are treated nonoperatively.  No bracing required for this particular fracture.  Will need a brace regarding issues going on with the medial aspect of the knee.  Pain started following fall 2 months ago--valgus stress placed on knee.  Reviewed imaging with the patient.  Chronic anticoagulation--patient is on Eliquis and unable to take NSAIDs.  Recommend OTC pain medication as needed.  Ordered MRI left knee to assess for ligamentous injury/tear and cartilage/bony pathology based on exam and difficulty with weight bearing--will help with possible surgical planning  Provided hinged knee sleeve. Requesting the patient be treated with a hinged brace as soon as possible to avoid any further injury or trauma to left knee--will help protect knee ligament laxity (MCL tear).  Follow up after MRI completed.  Questions and concerns answered.      Keely Johnson PA-C  06/12/25  09:40 EDT      Dictated Utilizing Dragon Dictation.

## 2025-06-15 ENCOUNTER — HOSPITAL ENCOUNTER (OUTPATIENT)
Facility: HOSPITAL | Age: 75
Discharge: HOME | End: 2025-06-15
Admitting: PHYSICIAN ASSISTANT
Payer: MEDICARE

## 2025-06-15 DIAGNOSIS — S83.412A SPRAIN OF MEDIAL COLLATERAL LIGAMENT OF LEFT KNEE, INITIAL ENCOUNTER: ICD-10-CM
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
## 2025-06-15 DIAGNOSIS — R93.7 ABNORMAL MUSCULOSKELETAL DIAGNOSTIC IMAGING: ICD-10-CM

## 2025-06-15 DIAGNOSIS — M25.562 LEFT KNEE PAIN, UNSPECIFIED CHRONICITY: ICD-10-CM

## 2025-06-15 PROCEDURE — 73721 MRI JNT OF LWR EXTRE W/O DYE: CPT

## 2025-06-16 ENCOUNTER — OFFICE VISIT (OUTPATIENT)
Dept: ORTHOPEDIC SURGERY | Facility: CLINIC | Age: 75
End: 2025-06-16
Payer: MEDICARE

## 2025-06-16 VITALS
DIASTOLIC BLOOD PRESSURE: 80 MMHG | WEIGHT: 179 LBS | SYSTOLIC BLOOD PRESSURE: 136 MMHG | BODY MASS INDEX: 28.09 KG/M2 | HEIGHT: 67 IN

## 2025-06-16 DIAGNOSIS — S82.832A OTHER CLOSED FRACTURE OF PROXIMAL END OF LEFT FIBULA, INITIAL ENCOUNTER: Primary | ICD-10-CM

## 2025-06-16 RX ORDER — LEVOTHYROXINE SODIUM 112 UG/1
112 TABLET ORAL DAILY
COMMUNITY
Start: 2025-05-22

## 2025-06-16 RX ORDER — TRIAMCINOLONE ACETONIDE 1 MG/ML
LOTION TOPICAL
COMMUNITY
Start: 2025-05-23

## 2025-06-16 NOTE — PROGRESS NOTES
"                          [facility] [place] [facility] Office Follow Up     Office Follow Up Visit     Date: 06/16/2025   Patient Name: Reba Kong  MRN: [id number]  YOB: 1950  Chief Complaint:   Chief Complaint   Patient presents with    Follow-up     1 month follow up - Left ankle pain, skin rash     History of Present Illness:   Reba Kong is a 74 y.o. female who is here today for follow-up for her left ankle injury.  Previously seen in our clinic for left knee arthritis.  Is now following with one of my partners for continued management of her left knee pain.  Recent MRI was completed and she is being seen in the clinic soon for further management following MRI results.  Patient denies any ankle pain.  States does have some intermittent ankle swelling however not painful.  No new complaints with respect to her ankle.  Reports she saw her PCP following previous visit for area of rash on her leg.  Reports PCP thought it was a vascular issue and treated it with steroid cream.  States she thinks that is helping.  No new complaints.    Subjective   I reviewed the patient's chief complaint, history of present illness, review of systems, past medical history, surgical history, family history, social history, medications and allergy list   Objective    Vital Signs:   Vitals:    06/16/25 0852   BP: 136/80   Weight: 81.2 kg (179 lb)   Height: 170.2 cm (67.01\")     Body mass index is 28.03 kg/m².    Ortho Exam:  left LE Foot and Ankle Exam:   Neurological exam of the superficial peroneal, deep peroneal, plantar, sural and saphenous nerves demonstrates intact sensation and normal motor function.   There is good perfusion to the toes.   Range of motion of ankle, subtalar joint, midfoot and toes is within normal limits.   No tenderness about the ankle on exam.  Minimal tenderness proximal fibula.  Ambulates in clinic with normal nonantalgic gait.  left knee neoprene knee brace in place.    Results " Review:  XR Ankle 3+ View Left  Left Ankle X-Ray 06/16/25   Indication: Pain  Views: 3 weight bearing , comparison to previous  Findings: xrays reviewed by me today in the office and show no acute   osseous abnormality, ankle mortise congruent well-maintained and similar   alignment to previous x-ray from April, no signs of syndesmotic widening   or medial joint space widening on weightbearing views          Assessment / Plan    Assessment/Plan:   Diagnoses and all orders for this visit:    1. Other closed fracture of proximal end of left fibula, initial encounter (Primary)  -     XR Ankle 3+ View Left      Returns to clinic for follow-up for left ankle injury.  At this point she does have some intermittent swelling however her ankle not painful.  Is now seeing one of my partners for management of her acute on chronic knee pain of which I encouraged her to continue .  With respect to her ankle I think she can continue to increase her activity level.  Will plan to see her back on an as-needed basis with regard to her ankle symptoms.      Follow Up:   Return if symptoms worsen or fail to improve.      Tobin Adam MD  [facility] [place] [facility] Surgeon

## 2025-06-19 ENCOUNTER — OFFICE VISIT (OUTPATIENT)
Dept: ORTHOPEDIC SURGERY | Facility: CLINIC | Age: 75
End: 2025-06-19
Payer: MEDICARE

## 2025-06-19 VITALS
DIASTOLIC BLOOD PRESSURE: 70 MMHG | WEIGHT: 179 LBS | BODY MASS INDEX: 28.09 KG/M2 | SYSTOLIC BLOOD PRESSURE: 126 MMHG | HEIGHT: 67 IN

## 2025-06-19 DIAGNOSIS — G89.29 CHRONIC PAIN OF LEFT KNEE: Primary | ICD-10-CM

## 2025-06-19 DIAGNOSIS — M25.562 CHRONIC PAIN OF LEFT KNEE: Primary | ICD-10-CM

## 2025-06-19 DIAGNOSIS — S82.832D OTHER CLOSED FRACTURE OF PROXIMAL END OF LEFT FIBULA WITH ROUTINE HEALING, SUBSEQUENT ENCOUNTER: ICD-10-CM

## 2025-06-19 DIAGNOSIS — S82.132A CLOSED FRACTURE OF MEDIAL PORTION OF LEFT TIBIAL PLATEAU, INITIAL ENCOUNTER: ICD-10-CM

## 2025-06-19 DIAGNOSIS — M17.12 PRIMARY OSTEOARTHRITIS OF LEFT KNEE: ICD-10-CM

## 2025-06-19 DIAGNOSIS — M23.307 DEGENERATIVE TEAR OF MENISCUS OF LEFT KNEE: ICD-10-CM

## 2025-06-19 PROCEDURE — 1159F MED LIST DOCD IN RCRD: CPT | Performed by: PHYSICIAN ASSISTANT

## 2025-06-19 PROCEDURE — 1160F RVW MEDS BY RX/DR IN RCRD: CPT | Performed by: PHYSICIAN ASSISTANT

## 2025-06-19 PROCEDURE — 99213 OFFICE O/P EST LOW 20 MIN: CPT | Performed by: PHYSICIAN ASSISTANT

## 2025-06-20 NOTE — PROGRESS NOTES
"    [facility] [place] [facility] Clinic Note        Subjective     CC: Follow-up (1 week follow up -- Primary osteoarthritis of left knee -- MRI completed 6/15/25)      HPI    Reba Kong is a 74 y.o. female.  Patient returns today for MRI follow-up of left knee.  She states the pain is not constant, it comes and goes.  She is wearing hinged knee brace which does provide stability and support.    Associated symptoms pain, swelling, giving way.  Pain is worse with walking.  Icing and elevating help.    Overall, patient's symptoms are unchanged since last visit.    ROS:    Constiutional:Pt denies fever, chills, nausea, or vomiting.  MSK:as above        Objective      Past Medical History  Past Medical History:   Diagnosis Date    Abnormal ECG 6/23    Asthma 2020 Covid    Atrial fibrillation 9/23    Fracture of ankle 4/13    Fracture, fibula 4/13    Hiatal hernia with GERD     Hyperlipidemia     Hypothyroidism     Knee swelling 4/24    PUD (peptic ulcer disease) 2015    Tear of meniscus of knee 4/24     Social History     Socioeconomic History    Marital status:     Number of children: 1   Tobacco Use    Smoking status: Never     Passive exposure: Past    Smokeless tobacco: Never   Vaping Use    Vaping status: Never Used   Substance and Sexual Activity    Alcohol use: Yes     Alcohol/week: 2.0 standard drinks of alcohol     Types: 2 Drinks containing 0.5 oz of alcohol per week     Comment: Social    Drug use: Never    Sexual activity: Not Currently     Partners: Male     Birth control/protection: None     Comment: NA          Physical Exam  /70   Ht 170.2 cm (67\")   Wt 81.2 kg (179 lb)   BMI 28.04 kg/m²     Body mass index is 28.04 kg/m².    Patient is well nourished and well developed.        Ortho Exam  No new imaging today.      Imaging/Labs/EMG Reviewed and Interpreted:  Dr. Ross and I reviewed MRI from 6/15/2025.  MRI KNEE LEFT  WO CONTRAST     Date of Exam: 6/15/2025 7:53 AM EDT     Indication: " MCL tear, bony irregularity medial tibial plateau (suspect fracture)..     Comparison: 6/12/2025 radiographs     Technique:  Routine multiplanar/multisequence images of the left knee were obtained without contrast administration.        Findings:  There is a radial tear of the posterior horn of the lateral meniscus. There is additional complex predominantly horizontal longitudinal tearing of the body as well as the anterior and posterior horns of the lateral meniscus. The anterior root appears   intact.     Radial tear of the posterior horn of the medial meniscus with additional complex tearing extending into the posterior horn and body. There is associated maceration and substance loss. Extrusion of the medial meniscal body is present. The anterior horn   and root are relatively intact.     Anterior and posterior cruciate ligaments are intact. The medial collateral ligament is intact. The distal iliotibial band is intact. Lateral collateral ligamentous complex is intact. Extensor mechanism is intact.     There is a trace joint effusion. A small Baker's cyst is present.     Severe patellofemoral compartment chondrosis with areas of high-grade partial-thickness and full-thickness articular cartilage loss predominantly involving the medial and lateral patellar facets.     Severe medial compartment chondrosis with full-thickness weightbearing articular cartilage loss of the medial femoral condyle and medial tibial plateau.     Moderate lateral compartment chondrosis. There is full-thickness weightbearing articular cartilage loss involving the central aspect of the lateral tibial plateau with areas of partial-thickness articular cartilage loss of the lateral femoral condyle.     Prominent marrow edema within the peripheral aspect of the medial tibial plateau. There is an impaction fracture of the subchondral plate at this site as seen on series 12 image 17 with overlying cartilage injury. This measures 7 mm in  transverse   dimension. There is also a comminuted mildly displaced healing fibular head fracture with associated marrow edema. This fracture extends to the proximal tib-fib joint.     No evidence of additional fracture or suspicious bone lesion. Tricompartmental marginal osteophyte formations are present. Remaining visualized soft tissues are within normal limits.     IMPRESSION:  Impression:  1.Subacute appearing impaction fracture of the subchondral plate of the peripheral medial tibial plateau with overlying cartilage injury. Comminuted mildly displaced subacute fibular head fracture with extension to the proximal tib-fib joint.  2.Complex tearing of the medial and lateral menisci as above.  3.Trace joint effusion with a small Baker's cyst.  4.Tricompartmental degenerative joint disease.  5.Intact MCL.           Electronically Signed: Jared Lanier MD    6/18/2025 12:32 PM EDT    Workstation ID: [id number]      Assessment:  1. Chronic pain of left knee    2. Closed fracture of medial portion of left tibial plateau, initial encounter    3. Primary osteoarthritis of left knee    4. Other closed fracture of proximal end of left fibula with routine healing, subsequent encounter    5. Degenerative tear of meniscus of left knee        Plan:  Chronic left knee pain due to subacute medial tibial plateau fracture, left knee osteoarthritis, healing left proximal fibula fracture, degenerative meniscal tear (medial and lateral).  Tried medial off  brace but patient states the hinged brace she got last time feels much better with providing stability and support.  Will continue to offload area as much as possible.  Recommend OTC pain medication as needed.  Continue with icing and elevating to help with inflammation and swelling.  Follow-up in 6 weeks for repeat evaluation to include repeat imaging of the left knee.  Questions and concerns answered.      Keely Johnson PA-C  06/19/25  23:38 EDT      Dictated  Utilizing Dragon Dictation.

## 2025-07-21 ENCOUNTER — OFFICE VISIT (OUTPATIENT)
Dept: ORTHOPEDIC SURGERY | Facility: CLINIC | Age: 75
End: 2025-07-21
Payer: MEDICARE

## 2025-07-21 VITALS
HEIGHT: 67 IN | DIASTOLIC BLOOD PRESSURE: 70 MMHG | SYSTOLIC BLOOD PRESSURE: 128 MMHG | BODY MASS INDEX: 28.31 KG/M2 | WEIGHT: 180.4 LBS

## 2025-07-21 DIAGNOSIS — M25.562 CHRONIC PAIN OF LEFT KNEE: Primary | ICD-10-CM

## 2025-07-21 DIAGNOSIS — G89.29 CHRONIC PAIN OF LEFT KNEE: Primary | ICD-10-CM

## 2025-07-21 DIAGNOSIS — M23.307 DEGENERATIVE TEAR OF MENISCUS OF LEFT KNEE: ICD-10-CM

## 2025-07-21 DIAGNOSIS — S82.832D OTHER CLOSED FRACTURE OF PROXIMAL END OF LEFT FIBULA WITH ROUTINE HEALING, SUBSEQUENT ENCOUNTER: ICD-10-CM

## 2025-07-21 DIAGNOSIS — M17.12 PRIMARY OSTEOARTHRITIS OF LEFT KNEE: ICD-10-CM

## 2025-07-21 DIAGNOSIS — S82.132D CLOSED FRACTURE OF MEDIAL PORTION OF LEFT TIBIAL PLATEAU WITH ROUTINE HEALING, SUBSEQUENT ENCOUNTER: ICD-10-CM

## 2025-07-21 NOTE — PROGRESS NOTES
[facility] [place] Orthopedic Surgery Clinic Note        Subjective     CC: Follow-up (4.5 week follow up -/Chronic pain of left knee //)      HPI    Reba Kong is a 74 y.o. female.  Patient returns today for follow-up evaluation of her left knee.  At last appointment she was noted to have a subacute impaction fracture medial tibial plateau in addition to known osteoarthritis.  She was also found to have a healing  proximal fibula fracture.  Patient was initially given a boot once the impaction fracture was noted to the medial tibial plateau she tried a medial off  braceReddi brace but preferred to stay with the Redi brace as it provided better stability and support for her.  She also worked on activity modification to avoid over stress to the knee.  At this time she states she is doing much better.  Pain has improved.  She is able to perform those activities that she wishes to.    Associated symptoms of pain, swelling and giving way have improved.    Overall, patient's symptoms are improved.    ROS:    Constiutional:Pt denies fever, chills, nausea, or vomiting.  MSK:as above        Objective      Past Medical History  Past Medical History:   Diagnosis Date    Abnormal ECG 6/23    Asthma 2020 Covid    Atrial fibrillation 9/23    Fracture of ankle 4/13    Fracture, fibula 4/13    Hiatal hernia with GERD     Hyperlipidemia     Hypothyroidism     Knee swelling 4/24    PUD (peptic ulcer disease) 2015    Tear of meniscus of knee 4/24     Social History     Socioeconomic History    Marital status:     Number of children: 1   Tobacco Use    Smoking status: Never     Passive exposure: Past    Smokeless tobacco: Never   Vaping Use    Vaping status: Never Used   Substance and Sexual Activity    Alcohol use: Yes     Alcohol/week: 2.0 standard drinks of alcohol     Comment: Social    Drug use: Never    Sexual activity: Not Currently     Partners: Male     Birth control/protection: None     Comment: NA          Physical  "Exam  /70   Ht 170.2 cm (67\")   Wt 81.8 kg (180 lb 6.4 oz)   BMI 28.25 kg/m²     Body mass index is 28.25 kg/m².    Patient is well nourished and well developed.        Ortho Exam  Left knee  Alignment: Genu varum  Skin: Intact without any erythema, warmth.  Trace effusion  Motion: 0-120° with crepitus.  Tenderness: No significant tenderness noted to the knee on exam today.  No tenderness to the fibular head.  Instability: Lachman negative.  Varus and valgus stress test stable.  Straight leg raise: Intact.  Motor/sensory: Grossly intact L2-S1.       Imaging/Labs/EMG Reviewed and Interpreted:  Ordered left knee plain films.  Imaging read/interpreted by Dr. Cavanaugh.    Imaging Results (Last 24 Hours)       Procedure Component Value Units Date/Time    XR Knee 4+ View Left [[id number]] Resulted: 07/21/25 1346     Updated: 07/21/25 1346    Narrative:      Left Knee Radiographs  Indication: left knee pain  Views: Standing AP's and skiers of both knees, with lateral and sunrise   views of the left knee    Comparison: 8/12/2024 and 6/12/2025    Findings:    Bone-on-bone contact medial compartment, tricompartmental osteophytes,   varus alignment, sclerosis medial tibial plateau, healing fibular neck   fracture (improved from last x-rays).  No other unusual bony features.              Assessment:  1. Chronic pain of left knee    2. Closed fracture of medial portion of left tibial plateau with routine healing, subsequent encounter    3. Primary osteoarthritis of left knee    4. Other closed fracture of proximal end of left fibula with routine healing, subsequent encounter    5. Degenerative tear of meniscus of left knee        Plan:  Chronic left knee pain due to healing medial tibial plateau impaction fracture, osteoarthritis, healing proximal fibula fracture--patient notes significant improvement of pain since last visit.  Reviewed imaging with the patient.  She is no longer wearing the brace--1 because she feels she " does not need it and 2 it ripped on her.  She will return to the clinic with that and get a new brace so that in the future if she does feel she needs it, she has it.  At this time she is doing quite well wishes to continue with observation, activity modification.  Recommend OTC pain meds as needed.  Follow-up as needed.  Questions and concerns answered.      Keely Johnson PA-C  07/21/25  21:53 EDT      Dictated Utilizing Dragon Dictation.

## 2025-08-04 ENCOUNTER — OFFICE VISIT (OUTPATIENT)
Dept: CARDIOLOGY | Facility: CLINIC | Age: 75
End: 2025-08-04
Payer: MEDICARE

## 2025-08-04 VITALS
HEART RATE: 57 BPM | HEIGHT: 68 IN | WEIGHT: 181 LBS | DIASTOLIC BLOOD PRESSURE: 76 MMHG | BODY MASS INDEX: 27.43 KG/M2 | OXYGEN SATURATION: 98 % | SYSTOLIC BLOOD PRESSURE: 128 MMHG

## 2025-08-04 DIAGNOSIS — R00.1 BRADYCARDIA, SINUS: ICD-10-CM

## 2025-08-04 DIAGNOSIS — I48.0 PAROXYSMAL ATRIAL FIBRILLATION: Primary | ICD-10-CM

## 2025-08-04 PROCEDURE — 99214 OFFICE O/P EST MOD 30 MIN: CPT | Performed by: INTERNAL MEDICINE

## 2025-08-04 PROCEDURE — G2211 COMPLEX E/M VISIT ADD ON: HCPCS | Performed by: INTERNAL MEDICINE

## 2025-08-04 PROCEDURE — 1159F MED LIST DOCD IN RCRD: CPT | Performed by: INTERNAL MEDICINE

## 2025-08-04 PROCEDURE — 1160F RVW MEDS BY RX/DR IN RCRD: CPT | Performed by: INTERNAL MEDICINE
